# Patient Record
Sex: FEMALE | Race: WHITE | URBAN - METROPOLITAN AREA
[De-identification: names, ages, dates, MRNs, and addresses within clinical notes are randomized per-mention and may not be internally consistent; named-entity substitution may affect disease eponyms.]

---

## 2021-03-01 ENCOUNTER — OFFICE VISIT (OUTPATIENT)
Dept: PRIMARY CARE CLINIC | Age: 51
End: 2021-03-01
Payer: COMMERCIAL

## 2021-03-01 VITALS
BODY MASS INDEX: 26.29 KG/M2 | WEIGHT: 148.4 LBS | HEART RATE: 104 BPM | DIASTOLIC BLOOD PRESSURE: 82 MMHG | SYSTOLIC BLOOD PRESSURE: 120 MMHG | HEIGHT: 63 IN | OXYGEN SATURATION: 97 %

## 2021-03-01 DIAGNOSIS — R47.89 WORD FINDING DIFFICULTY: ICD-10-CM

## 2021-03-01 DIAGNOSIS — Z13.1 SCREENING FOR DIABETES MELLITUS: ICD-10-CM

## 2021-03-01 DIAGNOSIS — Z13.29 SCREENING FOR THYROID DISORDER: ICD-10-CM

## 2021-03-01 DIAGNOSIS — Z13.220 SCREENING FOR LIPID DISORDERS: Primary | ICD-10-CM

## 2021-03-01 DIAGNOSIS — Z13.220 SCREENING FOR LIPID DISORDERS: ICD-10-CM

## 2021-03-01 DIAGNOSIS — F41.9 ANXIETY: ICD-10-CM

## 2021-03-01 DIAGNOSIS — R11.2 NAUSEA AND VOMITING, INTRACTABILITY OF VOMITING NOT SPECIFIED, UNSPECIFIED VOMITING TYPE: ICD-10-CM

## 2021-03-01 DIAGNOSIS — R42 VERTIGO: ICD-10-CM

## 2021-03-01 DIAGNOSIS — H55.09 VERTICAL NYSTAGMUS: Primary | ICD-10-CM

## 2021-03-01 LAB
A/G RATIO: 1.8 (ref 1.1–2.2)
ALBUMIN SERPL-MCNC: 4.8 G/DL (ref 3.4–5)
ALP BLD-CCNC: 58 U/L (ref 40–129)
ALT SERPL-CCNC: 49 U/L (ref 10–40)
ANION GAP SERPL CALCULATED.3IONS-SCNC: 14 MMOL/L (ref 3–16)
AST SERPL-CCNC: 27 U/L (ref 15–37)
BASOPHILS ABSOLUTE: 0.1 K/UL (ref 0–0.2)
BASOPHILS RELATIVE PERCENT: 0.8 %
BILIRUB SERPL-MCNC: 0.7 MG/DL (ref 0–1)
BUN BLDV-MCNC: 17 MG/DL (ref 7–20)
CALCIUM SERPL-MCNC: 9.8 MG/DL (ref 8.3–10.6)
CHLORIDE BLD-SCNC: 102 MMOL/L (ref 99–110)
CHOLESTEROL, FASTING: 231 MG/DL (ref 0–199)
CO2: 24 MMOL/L (ref 21–32)
CREAT SERPL-MCNC: 0.9 MG/DL (ref 0.6–1.1)
EOSINOPHILS ABSOLUTE: 0.3 K/UL (ref 0–0.6)
EOSINOPHILS RELATIVE PERCENT: 4.7 %
GFR AFRICAN AMERICAN: >60
GFR NON-AFRICAN AMERICAN: >60
GLOBULIN: 2.6 G/DL
GLUCOSE BLD-MCNC: 99 MG/DL (ref 70–99)
HCT VFR BLD CALC: 41.9 % (ref 36–48)
HDLC SERPL-MCNC: 54 MG/DL (ref 40–60)
HEMOGLOBIN: 14.2 G/DL (ref 12–16)
LDL CHOLESTEROL CALCULATED: 155 MG/DL
LYMPHOCYTES ABSOLUTE: 2 K/UL (ref 1–5.1)
LYMPHOCYTES RELATIVE PERCENT: 29.6 %
MCH RBC QN AUTO: 31.7 PG (ref 26–34)
MCHC RBC AUTO-ENTMCNC: 33.9 G/DL (ref 31–36)
MCV RBC AUTO: 93.5 FL (ref 80–100)
MONOCYTES ABSOLUTE: 0.3 K/UL (ref 0–1.3)
MONOCYTES RELATIVE PERCENT: 4.4 %
NEUTROPHILS ABSOLUTE: 4.1 K/UL (ref 1.7–7.7)
NEUTROPHILS RELATIVE PERCENT: 60.5 %
PDW BLD-RTO: 13.2 % (ref 12.4–15.4)
PLATELET # BLD: 265 K/UL (ref 135–450)
PMV BLD AUTO: 8 FL (ref 5–10.5)
POTASSIUM SERPL-SCNC: 4.2 MMOL/L (ref 3.5–5.1)
RBC # BLD: 4.49 M/UL (ref 4–5.2)
SODIUM BLD-SCNC: 140 MMOL/L (ref 136–145)
TOTAL PROTEIN: 7.4 G/DL (ref 6.4–8.2)
TRIGLYCERIDE, FASTING: 112 MG/DL (ref 0–150)
TSH REFLEX FT4: 2.44 UIU/ML (ref 0.27–4.2)
VLDLC SERPL CALC-MCNC: 22 MG/DL
WBC # BLD: 6.7 K/UL (ref 4–11)

## 2021-03-01 PROCEDURE — 36415 COLL VENOUS BLD VENIPUNCTURE: CPT | Performed by: NURSE PRACTITIONER

## 2021-03-01 PROCEDURE — 99203 OFFICE O/P NEW LOW 30 MIN: CPT | Performed by: NURSE PRACTITIONER

## 2021-03-01 RX ORDER — ONDANSETRON 4 MG/1
4 TABLET, FILM COATED ORAL EVERY 8 HOURS PRN
COMMUNITY
End: 2021-03-01

## 2021-03-01 RX ORDER — ONDANSETRON 4 MG/1
4 TABLET, FILM COATED ORAL EVERY 8 HOURS PRN
Qty: 6 TABLET | Refills: 0 | Status: SHIPPED | OUTPATIENT
Start: 2021-03-01 | End: 2021-03-03

## 2021-03-01 RX ORDER — MECLIZINE HCL 12.5 MG/1
12.5 TABLET ORAL 3 TIMES DAILY PRN
Qty: 15 TABLET | Refills: 0 | Status: SHIPPED | OUTPATIENT
Start: 2021-03-01 | End: 2021-03-11

## 2021-03-01 RX ORDER — LORAZEPAM 1 MG/1
TABLET ORAL
Qty: 1 TABLET | Refills: 0 | Status: SHIPPED | OUTPATIENT
Start: 2021-03-01 | End: 2021-03-01

## 2021-03-01 ASSESSMENT — PATIENT HEALTH QUESTIONNAIRE - PHQ9
2. FEELING DOWN, DEPRESSED OR HOPELESS: 0
SUM OF ALL RESPONSES TO PHQ QUESTIONS 1-9: 0
SUM OF ALL RESPONSES TO PHQ9 QUESTIONS 1 & 2: 0
1. LITTLE INTEREST OR PLEASURE IN DOING THINGS: 0
SUM OF ALL RESPONSES TO PHQ QUESTIONS 1-9: 0

## 2021-03-01 ASSESSMENT — ENCOUNTER SYMPTOMS
PHOTOPHOBIA: 0
EYE PAIN: 0
SWOLLEN GLANDS: 0
EYE ITCHING: 0
VISUAL CHANGE: 0
SHORTNESS OF BREATH: 0
CHANGE IN BOWEL HABIT: 0
SORE THROAT: 0
ABDOMINAL PAIN: 0
COUGH: 0
NAUSEA: 1
VOMITING: 1
EYE REDNESS: 0
EYE DISCHARGE: 0

## 2021-03-01 ASSESSMENT — VISUAL ACUITY: OU: 1

## 2021-03-01 NOTE — PATIENT INSTRUCTIONS
Have MRI completed  Rest/hydration  Zofran/Meclizine as needed   Seek emergent care if s/s worsen    Patient Education        Vertigo: Care Instructions  Your Care Instructions     Vertigo is the feeling that you or your surroundings are moving when there is no actual movement. It is often described as a feeling of spinning, whirling, falling, or tilting. Vertigo may make you vomit or feel nauseated. You may have trouble standing or walking and may lose your balance. Vertigo is often related to an inner ear problem, but it can have other more serious causes. If vertigo continues, you may need more tests to find its cause. Follow-up care is a key part of your treatment and safety. Be sure to make and go to all appointments, and call your doctor if you are having problems. It's also a good idea to know your test results and keep a list of the medicines you take. How can you care for yourself at home? · Do not lie flat on your back. Prop yourself up slightly. This may reduce the spinning feeling. Keep your eyes open. · Move slowly so that you do not fall. · If your doctor recommends medicine, take it exactly as directed. · Do not drive while you are having vertigo. Certain exercises, called Tran-Daroff exercises, can help decrease vertigo. To do Tran-Daroff exercises:  · Sit on the edge of a bed or sofa and quickly lie down on the side that causes the worst vertigo. Lie on your side with your ear down. · Stay in this position for at least 30 seconds or until the vertigo goes away. · Sit up. If this causes vertigo, wait for it to stop. · Repeat the procedure on the other side. · Repeat this 10 times. Do these exercises 2 times a day until the vertigo is gone. When should you call for help? Call 911 anytime you think you may need emergency care. For example, call if:    · You passed out (lost consciousness).     · You have symptoms of a stroke.  These may include:  ? Sudden numbness, tingling, weakness, or loss of movement in your face, arm, or leg, especially on only one side of your body. ? Sudden vision changes. ? Sudden trouble speaking. ? Sudden confusion or trouble understanding simple statements. ? Sudden problems with walking or balance. ? A sudden, severe headache that is different from past headaches. Call your doctor now or seek immediate medical care if:    · Vertigo occurs with a fever, a headache, or ringing in your ears.     · You have new or increased nausea and vomiting. Watch closely for changes in your health, and be sure to contact your doctor if:    · Vertigo gets worse or happens more often.     · Vertigo has not gotten better after 2 weeks. Where can you learn more? Go to [email or web address] org and sign in to your [employer] account. Enter G085 in the Crossfader box to learn more about \"Vertigo: Care Instructions. \"     If you do not have an account, please click on the \"Sign Up Now\" link. Current as of: April 15, 2020               Content Version: 12.6  © 2006-2020 [employer]. Care instructions adapted under license by [facility] ([facility]). If you have questions about a medical condition or this instruction, always ask your healthcare professional. [place] [address] any warranty or liability for your use of this information.

## 2021-03-01 NOTE — PROGRESS NOTES
3/1/2021    Isis Caraballo (:  1970) is a 52 y.o. female, here for evaluation of the following medical concerns:  Chief Complaint   Patient presents with    Dizziness     Pt started with dizziness Thursday. She states it is all the time but gets worse with movement.  Otalgia     R ear pain. Peg Cordero is here with her  she is reporting vertigo since Thursday when she sat up in bed, worsening over time, intermitted n/v.  Pt has been researching and discussing vertigo with family and friends and stated she started home Epley maneuvers however made her s/s worse and induced n/v. She also started taking old rx of zofran which greatly helped with the nausea. Reports cleaning her ears this morning noting scant blood on the qtip from her right ear, denies pain, however does report possible issues with hearing,  noting increased volume on the TV and pt noting asking people to repeat as she did not hear them. Dizziness  This is a new problem. The current episode started in the past 7 days. The problem occurs intermittently (daily). The problem has been gradually worsening. Associated symptoms include nausea, vertigo and vomiting. Pertinent negatives include no abdominal pain, anorexia, arthralgias, change in bowel habit, chest pain, chills, congestion, coughing, diaphoresis, fever, headaches, joint swelling, myalgias, neck pain, numbness, rash, sore throat, swollen glands, urinary symptoms, visual change or weakness. The symptoms are aggravated by bending, walking and twisting. She has tried relaxation, lying down and drinking (Epely maneuvers ) for the symptoms. The treatment provided mild relief. Review of Systems   Constitutional: Positive for activity change. Negative for chills, diaphoresis and fever. HENT: Negative for congestion, hearing loss ( no loss but noting changes) and sore throat. Eyes: Positive for visual disturbance (with vertigo).  Negative for photophobia, pain, Left eye: Nystagmus present. Conjunctiva/sclera: Conjunctivae normal.      Pupils: Pupils are equal, round, and reactive to light. Comments: Noting vertical nystagmus with left chucky-obando pike exam   Neck:      Musculoskeletal: Normal range of motion and neck supple. Cardiovascular:      Rate and Rhythm: Normal rate and regular rhythm. Pulses: Normal pulses. Heart sounds: Normal heart sounds. Pulmonary:      Effort: Pulmonary effort is normal.      Breath sounds: Normal breath sounds. Musculoskeletal: Normal range of motion. Skin:     General: Skin is warm and dry. Capillary Refill: Capillary refill takes less than 2 seconds. Neurological:      General: No focal deficit present. Mental Status: She is alert and oriented to person, place, and time. Psychiatric:         Attention and Perception: Attention and perception normal. She does not perceive auditory or visual hallucinations. Mood and Affect: Mood is anxious. Speech: Speech normal.         Behavior: Behavior normal.         Thought Content: Thought content normal.         Cognition and Memory: Cognition and memory normal.         Judgment: Judgment normal.         ASSESSMENT/PLAN:  Ca Stokes was seen today for dizziness, Noting vertical nystagmus worsening vertigo with n/v, word finding difficulty, concerns for central cause of vertigo or a vestibular schwannoma (acoustic neuroma). Pt will have MRI completed today with [facility], labs sent. Will call pt with results. Discussed red flags need for emergent care. Diagnoses and all orders for this visit:      1. Vertical nystagmus  - Comprehensive Metabolic Panel -Blood draw left AC one stick no issues noted/MC  - MRI BRAIN WO CONTRAST; Future  - CBC Auto Differential    2. Vertigo  - meclizine (ANTIVERT) 12.5 MG tablet; Take 1 tablet by mouth 3 times daily as needed for Dizziness  Dispense: 15 tablet;  Refill: 0  - Comprehensive Metabolic Panel  - MRI BRAIN WO CONTRAST; Future  - CBC Auto Differential    3. Nausea and vomiting, intractability of vomiting not specified, unspecified vomiting type  - ondansetron (ZOFRAN) 4 MG tablet; Take 1 tablet by mouth every 8 hours as needed for Nausea or Vomiting  Dispense: 6 tablet; Refill: 0  - Comprehensive Metabolic Panel  - MRI BRAIN WO CONTRAST; Future  - CBC Auto Differential    4. Word finding difficulty  - MRI BRAIN WO CONTRAST; Future  - CBC Auto Differential    5. Anxiety prior to MRI  - LORazepam (ATIVAN) 1 MG tablet; Take one tablet 30 min prior to Brain MRI  Dispense: 1 tablet; Refill: 0    Return if symptoms worsen or fail to improve. An  electronic signature was used to authenticate this note.     -- Mila Garcia, APRN - CNP on 3/1/2021 at 1:15 PM

## 2021-03-02 ENCOUNTER — NURSE ONLY (OUTPATIENT)
Dept: PRIMARY CARE CLINIC | Age: 51
End: 2021-03-02
Payer: COMMERCIAL

## 2021-03-02 DIAGNOSIS — I77.6 VASCULITIS (HCC): ICD-10-CM

## 2021-03-02 DIAGNOSIS — R93.89 ABNORMAL MRI: ICD-10-CM

## 2021-03-02 DIAGNOSIS — I77.6 VASCULITIS (HCC): Primary | ICD-10-CM

## 2021-03-02 DIAGNOSIS — H55.09 VERTICAL NYSTAGMUS: ICD-10-CM

## 2021-03-02 DIAGNOSIS — R90.89 ABNORMAL FINDING ON MRI OF BRAIN: ICD-10-CM

## 2021-03-02 LAB
C-REACTIVE PROTEIN: <3 MG/L (ref 0–5.1)
C3 COMPLEMENT: 141.1 MG/DL (ref 90–180)
ESTIMATED AVERAGE GLUCOSE: 119.8 MG/DL
HBA1C MFR BLD: 5.8 %
RHEUMATOID FACTOR: <10 IU/ML
SEDIMENTATION RATE, ERYTHROCYTE: 11 MM/HR (ref 0–20)

## 2021-03-02 PROCEDURE — 36415 COLL VENOUS BLD VENIPUNCTURE: CPT | Performed by: NURSE PRACTITIONER

## 2021-03-02 NOTE — PROGRESS NOTES
Pt's blood was obtained from her L arm with success on the 1st attempt. I told pt that we would call her once results are completed. Verbal understanding from pt.

## 2021-03-03 LAB — ANTI-NUCLEAR ANTIBODY (ANA): NEGATIVE

## 2021-03-04 ENCOUNTER — TELEPHONE (OUTPATIENT)
Dept: PRIMARY CARE CLINIC | Age: 51
End: 2021-03-04

## 2021-03-04 DIAGNOSIS — R93.89 ABNORMAL MRI: ICD-10-CM

## 2021-03-04 DIAGNOSIS — H55.09 VERTICAL NYSTAGMUS: ICD-10-CM

## 2021-03-04 DIAGNOSIS — I77.6 VASCULITIS (HCC): Primary | ICD-10-CM

## 2021-03-04 DIAGNOSIS — R42 VERTIGO: ICD-10-CM

## 2021-03-04 LAB — ANCA IFA: NORMAL

## 2021-03-04 NOTE — TELEPHONE ENCOUNTER
Pt requested referral to   THE [facility] Neurology- Donis Ahuja MD  [address] [place] [address],   [place], [address] [place] [address] Road  [phone]    Referral was submitted pt to call to schedule

## 2021-03-04 NOTE — TELEPHONE ENCOUNTER
Pt called on call provider to discuss referral, she states the provider I sent her to is not on her plan and she could not get in till march  ask that I send a referral to Octaviano Salcedo MD, PhD Neurosurgeon  [email or web address]   [address] [place], [place] [address]. [address] · [phone]    Will fax referral in the morning

## 2021-03-04 NOTE — TELEPHONE ENCOUNTER
Pt would like to speak with you regarding a referral.  She states she spoke with you last night but needs to speak with you again. I told her I would send a message back to you to call her.   Best contact number is [phone]

## 2021-03-05 ENCOUNTER — TELEPHONE (OUTPATIENT)
Dept: NEUROLOGY | Age: 51
End: 2021-03-05

## 2021-03-10 ENCOUNTER — OFFICE VISIT (OUTPATIENT)
Dept: NEUROLOGY | Age: 51
End: 2021-03-10
Payer: COMMERCIAL

## 2021-03-10 VITALS
SYSTOLIC BLOOD PRESSURE: 136 MMHG | HEART RATE: 97 BPM | TEMPERATURE: 98.2 F | HEIGHT: 63 IN | WEIGHT: 152 LBS | DIASTOLIC BLOOD PRESSURE: 92 MMHG | BODY MASS INDEX: 26.93 KG/M2

## 2021-03-10 DIAGNOSIS — R90.89 ABNORMAL FINDING ON MRI OF BRAIN: ICD-10-CM

## 2021-03-10 DIAGNOSIS — H81.13 BENIGN PAROXYSMAL POSITIONAL VERTIGO DUE TO BILATERAL VESTIBULAR DISORDER: Primary | ICD-10-CM

## 2021-03-10 PROCEDURE — 99245 OFF/OP CONSLTJ NEW/EST HI 55: CPT | Performed by: PSYCHIATRY & NEUROLOGY

## 2021-03-10 NOTE — PROGRESS NOTES
NEUROLOGY CONSULTATION     Chief Complaint   Patient presents with    New Patient     Gabi ALEXIS Diallo-CNP / abnormal MRI       HISTORY OF PRESENT ILLNESS :    Saurav Watkins is a 52 y.o. female who is referred by Dr. Jaclyn Amaro   History was obtained from patient  Additional history was obtained from the patient's . Patient was referred for evaluation of some dizziness as well as an abnormal MRI brain. Patient states that about 2 weeks ago she developed acute onset of dizziness and vertigo along with balance difficulties. She was unable to walk a straight line. She felt quite unsteady. She was talking to some friends who taught her some exercises and she when she tried to do them she became violently sick and was throwing up. And nurse evaluated her and found some vertical nystagmus on lateral gaze. She was seen by her primary care physician who ordered an MRI brain which showed some abnormalities and she was referred for neurological evaluation. During this episodes patient also had some brief episodes of confusion and memory impairment. Patient states that she may be slowly getting better but may be getting used to the symptoms. No other neurological symptoms.     REVIEW OF SYSTEMS    Constitutional:  []   Chills   [x]  Fatigue   []  Fevers   []  Malaise   []  Weight loss     [] Denies all of the above    Eyes:  []  Double vision   [x]  Blurry vision     [] Denies all of the above    Ears, nose, mouth, throat, and face:   [x] Hearing loss    []   Hoarseness      []  Snoring    []  Tinnitus       [] Denies all of the above     Respiratory:   []  Cough    []  Shortness of breath         [x] Denies all of the above     Cardiovascular:   [x]  Chest pain    [x]  Exertional chest pressure/discomfort           [] Palpitations    []  Syncope     [] Denies all of the above    Gastrointestinal:   []  Abdominal pain   [] Constipation    []  Diarrhea    []   Dysphagia                      [x] Denies all of the above    Genitourinary:      []  Frequency   []  Hematuria     []  Urinary incontinence           [x] Denies all of the above     Hematologic/lymphatic:  []  Bleeding    []  Easy bruising   []  Anemia  [x] Denies all of the above     Musculoskeletal:   [x] Back pain       []  Myalgias    [x]  Neck pain           [] Denies all of the above    Neurological: As noted in HPI    Behavioral/Psych:   [] Anxiety    []  Depression     []  Mood swings     [x] Denies all of the above     Endocrine:   []  Temperature intolerance     [x] Fatigue      [] Denies all of the above     Allergic/Immunologic:   [] Hay fever    [x] Denies all of the above     Past Medical History:   Diagnosis Date    Anxiety      Family History   Problem Relation Age of Onset    Cancer Mother         colon    Cancer Father         gastric     Social History     Socioeconomic History    Marital status:      Spouse name: None    Number of children: None    Years of education: None    Highest education level: None   Occupational History    None   Social Needs    Financial resource strain: None    Food insecurity     Worry: None     Inability: None    Transportation needs     Medical: None     Non-medical: None   Tobacco Use    Smoking status: Never Smoker    Smokeless tobacco: Never Used   Substance and Sexual Activity    Alcohol use: Yes     Comment: occasional     Drug use: Never    Sexual activity: None   Lifestyle    Physical activity     Days per week: None     Minutes per session: None    Stress: None   Relationships    Social connections     Talks on phone: None     Gets together: None     Attends Gnosticism service: None     Active member of club or organization: None     Attends meetings of clubs or organizations: None     Relationship status: None    Intimate partner violence     Fear of current or ex partner: None     Emotionally abused: None     Physically abused: None     Forced sexual activity: None   Other Topics Concern    None   Social History Narrative    None       PHYSICAL EXAMINATION:  BP (!) 136/92   Pulse 97   Temp 98.2 °F (36.8 °C)   Ht 5' 3\" (1.6 m)   Wt 152 lb (68.9 kg)   LMP  (LMP Unknown)   BMI 26.93 kg/m²   Appearance: Well appearing, well nourished and in no distress  Mental Status Exam: Patient is alert, oriented to person, place and time. Recent and remote memory is normal  Fund of Knowledge is normal  Attention/concentration is normal.   Speech : No dysarthria  Language : No aphasia  Funduscopic Exam: sharp disc margins  Cranial Nerves:   II: Visual fields:  Full to confrontation  III: Pupils:  equal, round, reactive to light  III,IV,VI: Extra Ocular Movements are intact. No nystagmus  V: Facial sensation is intact to pin prick and light touch  VII: Facial strength and movements: intact and symmetric smile,cheek puffing and eyebrow elevation  VIII: Hearing:  Intact to finger rub bilaterally  IX: Palate  elevation is symmetric  XI: Shoulder shrug is intact  XII: Tongue movements are normal  Motor:  Muscle tone and bulk are normal.   Strength is symmetrical 5/5 in all four extremities. Sensory: Intact to light touch and  pin prick in all four extremities  Coordination:  Normal  Finger to Nose and Heel to Shin bilaterally    . Reflexes:  DTR +2 and symmetric bilaterally  Plantar response: Flexor bilaterally  Gait: Gait and station is normal. Patient can toe/ heel and tandem walk without difficulty  Romberg: negative  Vascular: No carotid bruit bilaterally        DATA:  LABS:  General Labs:    CBC:   Lab Results   Component Value Date    WBC 6.7 03/01/2021    RBC 4.49 03/01/2021    HGB 14.2 03/01/2021    HCT 41.9 03/01/2021    MCV 93.5 03/01/2021    MCH 31.7 03/01/2021    MCHC 33.9 03/01/2021    RDW 13.2 03/01/2021     03/01/2021    MPV 8.0 03/01/2021     BMP:    Lab Results   Component Value Date     03/01/2021    K 4.2 03/01/2021     03/01/2021    CO2 24 03/01/2021    BUN 17 03/01/2021    LABALBU 4.8 03/01/2021    CREATININE 0.9 03/01/2021    CALCIUM 9.8 03/01/2021    GFRAA >60 03/01/2021    LABGLOM >60 03/01/2021    GLUCOSE 99 03/01/2021     RADIOLOGY REVIEW:  I have reviewed radiology image(s) and reports(s) of: MRI brain from [facility]    IMPRESSION :  Patient has positional vertigo. She had a strongly positive Nylan Barany maneuver done at the bedside today. MRI brain images were independently reviewed with the patient and her . She has some T2 flair signal abnormalities in the frontal lobes bilaterally. These are nonspecific in nature and the differential diagnosis is rather extensive. Vasculitis was considered in the differential diagnosis. However patient has had extensive lab testing to look for any vasculitis or connective tissue disease and these were all normal.  Complement level C3-C4, PB rheumatoid factor antineutrophilic cytoplasmic antibody TSH sedimentation rate hemoglobin A1c CBC and comprehensive metabolic profile were normal  I agree with the radiologist that there images are not typical for demyelinating disease like multiple sclerosis. Head trauma could cause a similar clinical picture. 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  Chronic migraines can also cause similar white matter changes. Paraneoplastic conditions were considered in the differential diagnosis but there is no history of known cancer. RECOMMENDATIONS :  Discussed at length with patient and her   She can continue meclizine if it is helpful. We discussed about a lumbar puncture. Since she seems to be slowly improving we decided to wait at this time. I have encouraged her to follow-up with her primary care doctor and get routine cancer screenings done what ever is appropriate for her age.   I have taught her some vestibular exercises and recommended that she do them

## 2021-03-16 DIAGNOSIS — R42 VERTIGO: Primary | ICD-10-CM

## 2021-03-16 RX ORDER — MECLIZINE HCL 12.5 MG/1
12.5 TABLET ORAL 3 TIMES DAILY PRN
Qty: 30 TABLET | Refills: 0 | Status: SHIPPED | OUTPATIENT
Start: 2021-03-16 | End: 2021-04-08 | Stop reason: SDUPTHER

## 2021-03-17 ENCOUNTER — TELEPHONE (OUTPATIENT)
Dept: PRIMARY CARE CLINIC | Age: 51
End: 2021-03-17

## 2021-03-17 ENCOUNTER — IMMUNIZATION (OUTPATIENT)
Dept: FAMILY MEDICINE CLINIC | Age: 51
End: 2021-03-17
Payer: COMMERCIAL

## 2021-03-17 DIAGNOSIS — M54.12 CERVICAL RADICULOPATHY, CHRONIC: ICD-10-CM

## 2021-03-17 DIAGNOSIS — R42 VERTIGO: Primary | ICD-10-CM

## 2021-03-17 PROCEDURE — 91300 COVID-19, PFIZER VACCINE 30MCG/0.3ML DOSE: CPT | Performed by: FAMILY MEDICINE

## 2021-03-17 PROCEDURE — 0001A COVID-19, PFIZER VACCINE 30MCG/0.3ML DOSE: CPT | Performed by: FAMILY MEDICINE

## 2021-03-17 NOTE — TELEPHONE ENCOUNTER
Pt called with concerna d/t vertigo, bilateral arm numbness, nausea, difficulty with sleep, anxiety. Questioning if she should go to the ED. patient recently, evaluated by neuro told she could be aggressive and have a spinal tap completed to rule out MS, she was given exercises and told to follow-up in 1 month and repeat MRI in 6 months. Patient reports meclizine has been taking the edge off but not very helpful with the dizziness. Patient reports history of cervical DDD diagnosed back in 2018 which could be causing the bilateral arm numbness/tingling    Last xray 2018- Vertebral body heights are maintained. Alignment is maintained. Mild disc space narrowing and degenerative endplate spurring identified at C4-C5. Remainder of the disc spaces are preserved. No acute fracture identified. Prevertebral soft tissues grossly within normal limits. IMPRESSION  Mild degenerative disc disease changes at C4-C5. No acute fracture identified. Alignment within normal limits. Discussed referrals for     ENT- [facility], [place] [address] [address] [phone]    [facility] [place] [facility] - Physical therapy for vertigo [phone] two location     Ortho/Dr. Candice Rendon. [address] [place] [address] David Arshad MD  [address] [place] [address]. [facility]  [phone]    I will put the referrals in however patient will discuss with insurance making sure these providers on her list and give us a call back.

## 2021-03-17 NOTE — TELEPHONE ENCOUNTER
Pt called regarding her labs and she believes it is the Thyroid. The service codes are 06595, 59153, 94631 and 12759. They were charged as being routine but insurance will not cover but will if It is diagnostic.

## 2021-04-01 ENCOUNTER — TELEPHONE (OUTPATIENT)
Dept: PRIMARY CARE CLINIC | Age: 51
End: 2021-04-01

## 2021-04-01 NOTE — TELEPHONE ENCOUNTER
Sent pt a REach message reminding her to schedule with Ortho. I received the following message below. We were unable to reach Colton Alanis to schedule test ordered by your office after 3 call attempts. Please call your patient to explain significance of ordered test and direct patient to call Central Scheduling to re-schedule test at their earliest convenience.

## 2021-04-05 ENCOUNTER — TELEPHONE (OUTPATIENT)
Dept: PRIMARY CARE CLINIC | Age: 51
End: 2021-04-05

## 2021-04-05 NOTE — TELEPHONE ENCOUNTER
Discussed Cervical MRI results with pt, she states she is awaiting Dr. Jiemy Noble, to call as well has a VV set up for sometime this week.     Advised pt to d/c chiropractic for now and f/u with Dr. Blankenship [facility] for further evaluation and treatment  Note findings CT MRI below

## 2021-04-07 ENCOUNTER — IMMUNIZATION (OUTPATIENT)
Dept: FAMILY MEDICINE CLINIC | Age: 51
End: 2021-04-07
Payer: COMMERCIAL

## 2021-04-08 ENCOUNTER — APPOINTMENT (OUTPATIENT)
Dept: CT IMAGING | Age: 51
End: 2021-04-08
Payer: COMMERCIAL

## 2021-04-08 ENCOUNTER — TELEPHONE (OUTPATIENT)
Dept: PRIMARY CARE CLINIC | Age: 51
End: 2021-04-08

## 2021-04-08 ENCOUNTER — HOSPITAL ENCOUNTER (EMERGENCY)
Age: 51
Discharge: HOME | End: 2021-04-08
Attending: EMERGENCY MEDICINE
Payer: COMMERCIAL

## 2021-04-08 VITALS
SYSTOLIC BLOOD PRESSURE: 116 MMHG | RESPIRATION RATE: 16 BRPM | BODY MASS INDEX: 26.22 KG/M2 | DIASTOLIC BLOOD PRESSURE: 76 MMHG | TEMPERATURE: 99 F | OXYGEN SATURATION: 96 % | HEART RATE: 92 BPM | WEIGHT: 148 LBS | HEIGHT: 63 IN

## 2021-04-08 DIAGNOSIS — R42 DIZZINESS: Primary | ICD-10-CM

## 2021-04-08 LAB
ANION GAP SERPL CALCULATED.3IONS-SCNC: 12 MMOL/L (ref 3–16)
BUN BLDV-MCNC: 21 MG/DL (ref 7–20)
CALCIUM SERPL-MCNC: 9.2 MG/DL (ref 8.3–10.6)
CHLORIDE BLD-SCNC: 104 MMOL/L (ref 99–110)
CO2: 22 MMOL/L (ref 21–32)
CREAT SERPL-MCNC: 0.8 MG/DL (ref 0.6–1.1)
EKG ATRIAL RATE: 96 BPM
EKG DIAGNOSIS: NORMAL
EKG P AXIS: 62 DEGREES
EKG P-R INTERVAL: 118 MS
EKG Q-T INTERVAL: 350 MS
EKG QRS DURATION: 82 MS
EKG QTC CALCULATION (BAZETT): 442 MS
EKG R AXIS: 64 DEGREES
EKG T AXIS: 50 DEGREES
EKG VENTRICULAR RATE: 96 BPM
GFR AFRICAN AMERICAN: >60
GFR NON-AFRICAN AMERICAN: >60
GLUCOSE BLD-MCNC: 96 MG/DL (ref 70–99)
POTASSIUM REFLEX MAGNESIUM: 3.6 MMOL/L (ref 3.5–5.1)
SODIUM BLD-SCNC: 138 MMOL/L (ref 136–145)

## 2021-04-08 PROCEDURE — 93005 ELECTROCARDIOGRAM TRACING: CPT | Performed by: EMERGENCY MEDICINE

## 2021-04-08 PROCEDURE — 99285 EMERGENCY DEPT VISIT HI MDM: CPT

## 2021-04-08 PROCEDURE — 96374 THER/PROPH/DIAG INJ IV PUSH: CPT

## 2021-04-08 PROCEDURE — 6360000004 HC RX CONTRAST MEDICATION: Performed by: STUDENT IN AN ORGANIZED HEALTH CARE EDUCATION/TRAINING PROGRAM

## 2021-04-08 PROCEDURE — 6360000002 HC RX W HCPCS: Performed by: STUDENT IN AN ORGANIZED HEALTH CARE EDUCATION/TRAINING PROGRAM

## 2021-04-08 PROCEDURE — 70498 CT ANGIOGRAPHY NECK: CPT

## 2021-04-08 PROCEDURE — 6360000002 HC RX W HCPCS: Performed by: EMERGENCY MEDICINE

## 2021-04-08 PROCEDURE — 96375 TX/PRO/DX INJ NEW DRUG ADDON: CPT

## 2021-04-08 PROCEDURE — 80048 BASIC METABOLIC PNL TOTAL CA: CPT

## 2021-04-08 RX ORDER — KETOROLAC TROMETHAMINE 30 MG/ML
15 INJECTION, SOLUTION INTRAMUSCULAR; INTRAVENOUS ONCE
Status: COMPLETED | OUTPATIENT
Start: 2021-04-08 | End: 2021-04-08

## 2021-04-08 RX ORDER — DIPHENHYDRAMINE HYDROCHLORIDE 50 MG/ML
12.5 INJECTION INTRAMUSCULAR; INTRAVENOUS ONCE
Status: COMPLETED | OUTPATIENT
Start: 2021-04-08 | End: 2021-04-08

## 2021-04-08 RX ORDER — MECLIZINE HYDROCHLORIDE 25 MG/1
25 TABLET ORAL 3 TIMES DAILY PRN
Qty: 20 TABLET | Refills: 0 | Status: SHIPPED | OUTPATIENT
Start: 2021-04-08 | End: 2021-09-01 | Stop reason: ALTCHOICE

## 2021-04-08 RX ORDER — PROCHLORPERAZINE EDISYLATE 5 MG/ML
10 INJECTION INTRAMUSCULAR; INTRAVENOUS ONCE
Status: COMPLETED | OUTPATIENT
Start: 2021-04-08 | End: 2021-04-08

## 2021-04-08 RX ORDER — ONDANSETRON 2 MG/ML
8 INJECTION INTRAMUSCULAR; INTRAVENOUS ONCE
Status: COMPLETED | OUTPATIENT
Start: 2021-04-08 | End: 2021-04-08

## 2021-04-08 RX ORDER — ONDANSETRON 2 MG/ML
4 INJECTION INTRAMUSCULAR; INTRAVENOUS ONCE
Status: DISCONTINUED | OUTPATIENT
Start: 2021-04-08 | End: 2021-04-08

## 2021-04-08 RX ORDER — IBUPROFEN 200 MG
400 TABLET ORAL EVERY 6 HOURS PRN
COMMUNITY

## 2021-04-08 RX ADMIN — DIPHENHYDRAMINE HYDROCHLORIDE 12.5 MG: 50 INJECTION, SOLUTION INTRAMUSCULAR; INTRAVENOUS at 17:35

## 2021-04-08 RX ADMIN — PROCHLORPERAZINE EDISYLATE 10 MG: 5 INJECTION INTRAMUSCULAR; INTRAVENOUS at 17:35

## 2021-04-08 RX ADMIN — KETOROLAC TROMETHAMINE 15 MG: 30 INJECTION, SOLUTION INTRAMUSCULAR at 17:35

## 2021-04-08 RX ADMIN — IOPAMIDOL 80 ML: 755 INJECTION, SOLUTION INTRAVENOUS at 17:59

## 2021-04-08 RX ADMIN — ONDANSETRON 8 MG: 2 INJECTION INTRAMUSCULAR; INTRAVENOUS at 19:22

## 2021-04-08 ASSESSMENT — ENCOUNTER SYMPTOMS
GASTROINTESTINAL NEGATIVE: 1
ALLERGIC/IMMUNOLOGIC NEGATIVE: 1
SHORTNESS OF BREATH: 0
EYES NEGATIVE: 1

## 2021-04-08 NOTE — ED PROVIDER NOTES
ED Attending Attestation Note     Date of evaluation: 4/8/2021    This patient was seen by the resident. I have seen and examined the patient, agree with the workup, evaluation, management and diagnosis. The care plan has been discussed. I have reviewed the ECG and concur with the resident's interpretation. I was present for any procedures performed in the resident's  note and have made edits to the note where appropriate. My assessment reveals 52 y.o. female with history of anxiety presenting to the emergency department today for multiple complaints over the past few weeks. She has had some intermittent dizziness that she describes as both a lightheadedness and a off-balance feeling without true room spinning dizziness. She has some intermittent shortness of breath and chest pains. Bilateral upper and lower extremity tingling as well. Her EKG is very reassuring and she has seen multiple specialists for the symptoms without a diagnosis. On my examination she is well-appearing, no focal neurologic deficits, heart regular rate and rhythm, lungs clear to auscultation. Will obtain basic electrolytes as well as a CTA of her head and neck due to recent chiropractic manipulation to ensure no vascular injury causing her symptoms. Otherwise, will plan to treat them symptomatically and have her follow-up with her PCP if CT is reassuring.          Sandy Stokes MD  04/08/21 9049

## 2021-04-08 NOTE — ED PROVIDER NOTES
[facility]       Date of evaluation: 4/8/2021    Chief Complaint     Chest Pain (Intermittent chest pressure and dizziness. First occurred 6 weeks ago. Symptoms returned today)      History of Present Illness     Josy Arana is a 52 y.o. female with the past medical history of anxiety who presents chest pain and dizziness. The patient states that 6 weeks ago she had an episode in which she felt dizzy, had vertigo, and had difficulty forming words. The patient then reached out to friends who had similar symptoms and been diagnosed with BPPV and Meniere's disease who recommended doing an Epley Maneuver which made the patient's symptoms worse. The patient states that since then she had an MRI that demonstrated T2 flair signal abnormalities in the frontal lobes bilaterally that were evaluated by neurology and determined to be non-specific (per chart review of neurology note on 3/10/21). The patient notes that she has also received a MRI of her Cervical Spine fairly recently that was unremarkable. The patient was prescribed meclizine from neurologist and noted that for her episodes of dizziness, she would take it and it would help it moderately but did not completely resolve symptoms. She has felt that dizziness has gotten better but she feels like she has been unsteady on her feet and has intermittent \"feelings of being drunk. \"     When the patient woke up this morning, the patient started to feel dizzy and notes that she had Pfizer 2nd dose of COVID vaccine yesterday. The patient feels like her chest is heavy right now and aches which comes and goes and is not related with exertion. It resolves after multiple deep breaths. With the exception of above, the patient does not endorse any alleviating or aggravating factors, and does not note any further complaints. Review of Systems     Review of Systems   Constitutional: Negative.     HENT: Negative. Eyes: Negative. Respiratory: Negative for shortness of breath. Cardiovascular: Positive for chest pain. Gastrointestinal: Negative. Endocrine: Negative. Genitourinary: Negative. Musculoskeletal: Negative. Skin: Negative. Allergic/Immunologic: Negative. Neurological: Positive for dizziness and light-headedness. Negative for syncope, weakness and numbness. Hematological: Negative. Psychiatric/Behavioral: Negative. Past Medical, Surgical, Family, and Social History     She has a past medical history of Anxiety. She has a past surgical history that includes  section; shoulder surgery; Dilation and curettage of uterus; and Knee arthroscopy. Her family history includes Cancer in her father and mother. She reports that she has never smoked. She has never used smokeless tobacco. She reports current alcohol use. She reports that she does not use drugs. Medications     Previous Medications    IBUPROFEN (ADVIL;MOTRIN) 200 MG TABLET    Take 400 mg by mouth every 6 hours as needed for Pain    MECLIZINE (ANTIVERT) 12.5 MG TABLET    Take 1 tablet by mouth 3 times daily as needed for Dizziness       Allergies     She is allergic to codeine. Physical Exam     IINITIAL VITALS: BP: 124/85, Temp: 99 °F (37.2 °C), Pulse: 102, Resp: 16, SpO2: 98 %     General: Patient is a 52 y.o. female who is not in acute distress    HEENT: Head atraumatic. Pupils equal, round, and reactive to light. Sclera clear. Mucous membranes moist. Oropharynx clear. Neck:  Supple. No lymphadenopathy. Pulmonary:   Normal respiratory effort. Lungs clear to auscultation bilaterally, with no wheezes, rales or rhonchi. Cardiac:  Regular rate and rhythm. Normal S1, S2. No murmurs, rubs or gallops. Abdomen:  Soft. Non-tender. Non-distended. No rebound tenderness or guarding. Musculoskeletal:  No obvious deformities. No tenderness to palpation.      Vascular:  Radial pulses 2+ bilaterally. Skin:  Warm, dry, well-perfused. No rash. Neuro: AAOx4. CN II-XII grossly intact. Normal gait. Sensation grossly intact. Strength grossly equal and symmetric. Extremities:  No peripheral edema. Diagnostic Results     EKG   Interpreted in conjunction with emergency department physician No att. providers found  NSR without any ST elevations, depressions, or T wave inversions    RADIOLOGY:  CTA HEAD NECK W CONTRAST   Final Result   1. Normal right internal carotid artery. .   2. Normal left internal carotid artery   3. Normal vertebral arteries. 4. Normal intracranial circulation. 5. No abnormal intracranial enhancement or early draining veins          LABS:   Results for orders placed or performed during the hospital encounter of 65/85/62   Basic Metabolic Panel w/ Reflex to MG   Result Value Ref Range    Sodium 138 136 - 145 mmol/L    Potassium reflex Magnesium 3.6 3.5 - 5.1 mmol/L    Chloride 104 99 - 110 mmol/L    CO2 22 21 - 32 mmol/L    Anion Gap 12 3 - 16    Glucose 96 70 - 99 mg/dL    BUN 21 (H) 7 - 20 mg/dL    CREATININE 0.8 0.6 - 1.1 mg/dL    GFR Non-African American >60 >60    GFR African American >60 >60    Calcium 9.2 8.3 - 10.6 mg/dL   EKG 12 Lead   Result Value Ref Range    Ventricular Rate 96 BPM    Atrial Rate 96 BPM    P-R Interval 118 ms    QRS Duration 82 ms    Q-T Interval 350 ms    QTc Calculation (Bazett) 442 ms    P Axis 62 degrees    R Axis 64 degrees    T Axis 50 degrees    Diagnosis       EKG performed in ER and to be interpreted by ER physician. Confirmed by MD, ER ([id number]),  Thor Miller ([phone]) on 4/8/2021 6:59:05 PM         RECENT VITALS:  BP: 116/76, Temp: 99 °F (37.2 °C), Pulse: 92, Resp: 16, SpO2: 96 %     Procedures     None    ED Course     Nursing Notes, Past Medical Hx, Past Surgical Hx, Social Hx, Allergies, and Family Hx were reviewed.     The patient was given the following medications:  Orders Placed This Encounter   Medications    ketorolac (TORADOL) injection 15 mg    prochlorperazine (COMPAZINE) injection 10 mg    diphenhydrAMINE (BENADRYL) injection 12.5 mg    iopamidol (ISOVUE-370) 76 % injection 80 mL    DISCONTD: ondansetron (ZOFRAN) injection 4 mg    ondansetron (ZOFRAN) injection 8 mg       CONSULTS:  None    MEDICAL DECISION MAKING / ASSESSMENT / Gena Josselyn is a 52 y.o. female that is presenting for headache and dizziness. Given that patient had no vessel imaging performed in the past with recent chiropractor use and adjustment of neck, we elected to obtain vessel imaging that was unremarkable and did not demonstrate any signs of dissection. Patient's chest pain did not appear to be anginal in nature given that it was random at onset and dissipated with deep breaths and EKG did not demonstrate any findings suggestive of ischemia. The patient's BMP was unremarkable. Given that patient has follow up with ENT for potential for peripheral vertigo and prior MRI did not indicated central process to source of patient's symptoms, the patient deemed safe for discharge. The patient had moderate improvement with compazine/toradol/benadryl combination but felt that meclizine had worked better in the past and therefore was encouraged to use meclizine as needed for vertigo and follow up with ENT as scheduled. This patient was also evaluated by the attending physician. All care plans were discussed and agreed upon. Clinical Impression     1. Dizziness        Disposition     PATIENT REFERRED TO:  No follow-up provider specified.     DISCHARGE MEDICATIONS:  New Prescriptions    No medications on file       Juliana Wade MD  Resident  04/08/21 0797

## 2021-04-08 NOTE — TELEPHONE ENCOUNTER
Pt called and left message on answering machine regarding dizziness. She stated on the message that she didn't know if she should be seen in the OV or go to the ED. I called pt back to let her know that the provider was out of the office and I was at [facility] getting my CPR completed. She was currently at [facility]. to be evaluated. Her symptoms were dizziness, some confusion off and on numbness in arms and legs and sharp pains in both legs. I told her that we should get the ED report to keep us posted and that I am praying for her.

## 2021-04-09 PROCEDURE — 91300 COVID-19, PFIZER VACCINE 30MCG/0.3ML DOSE: CPT | Performed by: FAMILY MEDICINE

## 2021-04-09 PROCEDURE — 0002A COVID-19, PFIZER VACCINE 30MCG/0.3ML DOSE: CPT | Performed by: FAMILY MEDICINE

## 2021-04-09 NOTE — TELEPHONE ENCOUNTER
I called and spoke with pt. She states that she is feeling better today. They ruled out any vein issues. He suggested that she could have been having migraines of a rare kind. They also suggested she see an ENT like you also suggested. They did not feel she needed to see an ortho that they would say to keep an eye on it. They also said it could be a reaction from getting her 2nd covid-19 vaccine. She got that done on Wednesday. I told pt to keep us informed after she sees the ENT.

## 2021-04-12 ENCOUNTER — OFFICE VISIT (OUTPATIENT)
Dept: NEUROLOGY | Age: 51
End: 2021-04-12
Payer: COMMERCIAL

## 2021-04-12 VITALS
DIASTOLIC BLOOD PRESSURE: 86 MMHG | TEMPERATURE: 98.4 F | HEART RATE: 96 BPM | WEIGHT: 148 LBS | HEIGHT: 63 IN | SYSTOLIC BLOOD PRESSURE: 131 MMHG | BODY MASS INDEX: 26.22 KG/M2

## 2021-04-12 DIAGNOSIS — H81.13 BENIGN PAROXYSMAL POSITIONAL VERTIGO DUE TO BILATERAL VESTIBULAR DISORDER: Primary | ICD-10-CM

## 2021-04-12 DIAGNOSIS — R90.89 ABNORMAL FINDING ON MRI OF BRAIN: ICD-10-CM

## 2021-04-12 DIAGNOSIS — R27.0 ATAXIA: ICD-10-CM

## 2021-04-12 PROCEDURE — 99214 OFFICE O/P EST MOD 30 MIN: CPT | Performed by: PSYCHIATRY & NEUROLOGY

## 2021-04-12 NOTE — PROGRESS NOTES
None     Gets together: None     Attends Baptism service: None     Active member of club or organization: None     Attends meetings of clubs or organizations: None     Relationship status: None    Intimate partner violence     Fear of current or ex partner: None     Emotionally abused: None     Physically abused: None     Forced sexual activity: None   Other Topics Concern    None   Social History Narrative    None        Objective:  Exam:  Ht 5' 3\" (1.6 m)   Wt 148 lb (67.1 kg)   LMP 03/29/2021   BMI 26.22 kg/m²   This is a well-nourished patient in no acute distress  Patient is awake, alert and oriented x3. Speech is normal.  Pupils are equal round reacting to light. Extraocular movements intact. Face symmetrical. Tongue midline. Motor exam shows normal symmetrical strength. Deep tendon reflexes normal. Plantar reflexes downgoing. Sensory exam normal. Coordination normal. Gait normal. No carotid bruit. No neck stiffness. Data :  LABS:  General Labs:    CBC:   Lab Results   Component Value Date    WBC 6.7 03/01/2021    RBC 4.49 03/01/2021    HGB 14.2 03/01/2021    HCT 41.9 03/01/2021    MCV 93.5 03/01/2021    MCH 31.7 03/01/2021    MCHC 33.9 03/01/2021    RDW 13.2 03/01/2021     03/01/2021    MPV 8.0 03/01/2021     BMP:    Lab Results   Component Value Date     04/08/2021    K 3.6 04/08/2021     04/08/2021    CO2 22 04/08/2021    BUN 21 04/08/2021    LABALBU 4.8 03/01/2021    CREATININE 0.8 04/08/2021    CALCIUM 9.2 04/08/2021    GFRAA >60 04/08/2021    LABGLOM >60 04/08/2021    GLUCOSE 96 04/08/2021     RADIOLOGY REVIEW:  I have reviewed radiology image(s) and reports(s) of: MRI brain. I also reviewed the MRI report of the cervical spine. 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 She was wondering about Ménière's disease which enters into the differential diagnosis.   Patient was seen in the emergency room and had a CT angiogram of her head and neck which did not show any vascular disease. MRI brain had shown some vague white matter lesions in the frontal lobes bilaterally. Chronic neck pain probably from disc disease    Plan :  Discussed with patient  She was concerned about B12 deficiency and I will get a B12 level  I would also recommend an ENT evaluation. She may need vestibular therapy. If none of these are helpful then I would proceed with a lumbar puncture. She will continue meclizine on a as needed basis        Please note a portion of  this chart was generated using dragon dictation software. Although every effort was made to ensure the accuracy of this automated transcription, some errors in transcription may have occurred.

## 2021-04-20 NOTE — PROGRESS NOTES
Mario Durant   1970, 52 y.o. female   <[id number]>       Referring Provider: Destin Horner DO  Referral Type: In an order in [address] [place] [address]    Reason for Visit: Evaluation of suspected change in hearing, tinnitus, or balance. ADULT AUDIOLOGIC EVALUATION      Mario Durant is a 52 y.o. female seen today, 4/27/2021, for an initial audiologic evaluation. Patient was seen accompanied by her . AUDIOLOGIC AND OTHER PERTINENT MEDICAL HISTORY:        Mario Durant noted dizziness, seen in ED 4/8/2021 and has been evaluated by neurology, present for several weeks, when it was most noticeable, she felt like her vision was like cartoon slides and a rocking sensation, had tried some exercises recommended by friends that worsened problem, no benefit from different exercises recommended by neurology, now mostly feels like she is unsteady with her footing; dizziness associated with confusion, was worse weeks ago but still has some confusion at times; no changes in hearing or presence of other ear symptoms with dizziness; history of head trauma years ago, none recently. Mario Durant denied otalgia, aural fullness, otorrhea, tinnitus, history of occupational/recreational noise exposure, history of ear surgery, and family history of hearing loss. IMPRESSIONS:       Today's results are consistent with hearing sensitivity within normal limits with normal middle ear function and excellent word recognition for soft conversational speech bilaterally. Discussed good communication strategies; follow medical recommendations from Dr. Eleazar Linder. ASSESSMENT AND FINDINGS:       Otoscopy revealed: Clear ear canals bilaterally      RIGHT EAR:  Hearing Sensitivity: Within normal limits. Speech Recognition Threshold: 10 dBHL  Word Recognition: Excellent (100%), based on NU-6 25-word list at 45 dBHL using recorded speech stimuli.     Tympanometry: Normal peak pressure and compliance, Type A tympanogram, consistent with normal middle ear function. LEFT EAR:  Hearing Sensitivity: Within normal limits. Speech Recognition Threshold: 10 dBHL  Word Recognition: Excellent (96%), based on NU-6 25-word list at 45 dBHL using recorded speech stimuli. Tympanometry: Normal peak pressure and compliance, Type A tympanogram, consistent with normal middle ear function. Reliability: Good  Transducer: Inserts    See scanned audiogram dated 4/27/2021 for results. PATIENT EDUCATION:       The following items were discussed with the patient:    - Good Communication Strategies    - Dizziness    Educational information was shared in the After Visit Summary. RECOMMENDATIONS:                                                                                                                                                                                                                                                                      The following items are recommended based on patient report and results from today's appointment:   - Continue medical follow-up with Berenice Luo DO.   - Retest hearing as medically indicated and/or sooner if a change in hearing is noted. - Utilize \"Good Communication Strategies\" as discussed to assist in speech understanding with communication partners. - If medically indicated, consider vestibular evaluation to further investigate symptoms of dizziness. [facility] [place], [place]  Audiologist      Chart CC'd to:  Berenice Luo DO      Degree of   Hearing Sensitivity dB Range   Within Normal Limits (WNL) 0 - 20   Mild 20 - 40   Moderate 40 - 55   Moderately-Severe 55 - 70   Severe 70 - 90   Profound 90 +

## 2021-04-22 LAB — VITAMIN B-12: 624 PG/ML

## 2021-04-26 DIAGNOSIS — H91.90 HEARING LOSS, UNSPECIFIED HEARING LOSS TYPE, UNSPECIFIED LATERALITY: Primary | ICD-10-CM

## 2021-04-27 ENCOUNTER — OFFICE VISIT (OUTPATIENT)
Dept: ENT CLINIC | Age: 51
End: 2021-04-27
Payer: COMMERCIAL

## 2021-04-27 ENCOUNTER — PROCEDURE VISIT (OUTPATIENT)
Dept: AUDIOLOGY | Age: 51
End: 2021-04-27
Payer: COMMERCIAL

## 2021-04-27 VITALS
TEMPERATURE: 98.8 F | HEIGHT: 63 IN | HEART RATE: 105 BPM | DIASTOLIC BLOOD PRESSURE: 81 MMHG | BODY MASS INDEX: 25.87 KG/M2 | SYSTOLIC BLOOD PRESSURE: 114 MMHG | WEIGHT: 146 LBS

## 2021-04-27 DIAGNOSIS — Z01.10 ENCOUNTER FOR EXAMINATION OF EARS AND HEARING WITHOUT ABNORMAL FINDINGS: ICD-10-CM

## 2021-04-27 DIAGNOSIS — R42 DIZZINESS: Primary | ICD-10-CM

## 2021-04-27 DIAGNOSIS — R42 DIZZINESS AND GIDDINESS: Primary | ICD-10-CM

## 2021-04-27 DIAGNOSIS — G43.809 VESTIBULAR MIGRAINE: ICD-10-CM

## 2021-04-27 PROCEDURE — 99204 OFFICE O/P NEW MOD 45 MIN: CPT | Performed by: OTOLARYNGOLOGY

## 2021-04-27 PROCEDURE — 92567 TYMPANOMETRY: CPT | Performed by: AUDIOLOGIST

## 2021-04-27 PROCEDURE — 92557 COMPREHENSIVE HEARING TEST: CPT | Performed by: AUDIOLOGIST

## 2021-04-27 ASSESSMENT — ENCOUNTER SYMPTOMS
STRIDOR: 0
SHORTNESS OF BREATH: 0
VOMITING: 0
NAUSEA: 0
SINUS PRESSURE: 0
EYE DISCHARGE: 0
DIARRHEA: 0
COLOR CHANGE: 0
CHOKING: 0
TROUBLE SWALLOWING: 0
COUGH: 0
FACIAL SWELLING: 0
PHOTOPHOBIA: 0
RHINORRHEA: 0
EYE ITCHING: 0
VOICE CHANGE: 0
CONSTIPATION: 0
WHEEZING: 0
BACK PAIN: 0
SORE THROAT: 0
BLOOD IN STOOL: 0
SINUS PAIN: 0

## 2021-04-27 NOTE — Clinical Note
Dr. Jaime Gutiérrez,    Please see note from this patient's audiogram from today. Please let me know if there is anything further you need.       Tiffanie Paris [address] [place]  Audiologist

## 2021-04-27 NOTE — PATIENT INSTRUCTIONS
Good Communication Strategies    Communication can be challenging for anyone, but can be especially difficult for those with some degree of hearing loss. While we may not be able to control every factor that may lead to difficulty with communication, there are Good Communication Strategies that we can all use in our day-to-day lives. Communication takes both parties working together for it to be successful. Tips as a Listener:   1. Control your environment. It is important to limit the amount of background noise in the room when possible. You should also consider having a good light source in the room to best see the other person. 2. Ask for clarification. Instead of saying \"What?\", you can use parts of what you heard to make a new question. For example, if you heard the word \"Thursday\" but not the rest of the week, you may ask \"What was that about Thursday? \" or \"What did you want to do Thursday? \". This shows the person talking that you are listening and will help them better explain what they are saying. 3. Be an advocate for yourself. If you are hearing but not understanding, tell the other person \"I can hear you, but I need you to slow down when you speak. \"  Or if someone is facing the other direction, say \"I cannot hear you when you are not looking at me when we talk. \"       Tips as a Talker:   - Sit or stand 3 to 6 feet away to maximize audibility         -- It is unrealistic to believe someone else will fully hear your message if you are speaking from across the room or in a different room in the house   - Stay at eye level to help with visual cues   - Make sure you have the persons attention before speaking   - Use facial expressions and gestures to accentuate your message   - Raise your voice slightly (do not scream)   - Speak slowly and distinctly   - Use short, simple sentences   - Rephrase your words if the person is having a hard time understanding you    - To avoid distortion, dont speak directly into a persons ear      Some additional items that may be helpful:   - Remain patient - this is important for both parties   - Write down items that still cannot be heard/understood. You may write with pen/paper or consider typing/texting on a cell phone or smart device. - If background noise is unavoidable, try to keep yourself in a good position in the room. By sitting at a saleh on the side of the restaurant (preferably a corner), it will be easier to communicate than if you were sitting at a table in the middle with background noise surrounding you. Keep yourself positioned away from music speakers or heavy foot traffic.   - If you have difficulty with the television, consider these options:      -- Use closed-captioning, which is a setting you can turn on that displays the spoken words in a written form on the screen. There may be a slight delay, but this can help fill in missing information. This can be especially helpful when watching programs with accented speech. -- Consider use of a sound bar or speakers that come from the front of the TV. With modern flat screen TVs, many of them have speakers that come out of the back of the device, which makes sound bounce off the wall behind it, then go into the room. Sound bars can allow the sound to go straight in your direction and can improve sound quality. -- Consider ear level devices to help improve the volume and/or sound quality of the program.  There are devices that work like headphones that you can adjust the volume for your ears while others can have the volume at a more comfortable level, such as \"TV Ears\". Most hearing aids have devices that allow them to connect directly to the TV and improve sound quality. Dizziness: Care Instructions  Your Care Instructions  Dizziness is the feeling of unsteadiness or fuzziness in your head.  It is different than having vertigo, which is a feeling that the room is spinning or that you are moving or falling. It is also different from lightheadedness, which is the feeling that you are about to faint. It can be hard to know what causes dizziness. Some people feel dizzy when they have migraine headaches. Sometimes bouts of flu can make you feel dizzy. Some medical conditions, such as heart problems or high blood pressure, can make you feel dizzy. Many medicines can cause dizziness, including medicines for high blood pressure, pain, or anxiety. If a medicine causes your symptoms, your doctor may recommend that you stop or change the medicine. If it is a problem with your heart, you may need medicine to help your heart work better. If there is no clear reason for your symptoms, your doctor may suggest watching and waiting for a while to see if the dizziness goes away on its own. Follow-up care is a key part of your treatment and safety. Be sure to make and go to all appointments, and call your doctor if you are having problems. It's also a good idea to know your test results and keep a list of the medicines you take. How can you care for yourself at home? · If your doctor recommends or prescribes medicine, take it exactly as directed. Call your doctor if you think you are having a problem with your medicine. · Do not drive while you feel dizzy. · Try to prevent falls. Steps you can take include:  ? Using nonskid mats, adding grab bars near the tub, and using night-lights. ? Clearing your home so that walkways are free of anything you might trip on.  ? Letting family and friends know that you have been feeling dizzy. This will help them know how to help you. When should you call for help? Call 911 anytime you think you may need emergency care. For example, call if:    · You passed out (lost consciousness). · You have dizziness along with symptoms of a heart attack. These may include:  ? Chest pain or pressure, or a strange feeling in the chest.  ? Sweating. ?  Shortness of breath. ? Nausea or vomiting. ? Pain, pressure, or a strange feeling in the back, neck, jaw, or upper belly or in one or both shoulders or arms. ? Lightheadedness or sudden weakness. ? A fast or irregular heartbeat. · You have symptoms of a stroke. These may include:  ? Sudden numbness, tingling, weakness, or loss of movement in your face, arm, or leg, especially on only one side of your body. ? Sudden vision changes. ? Sudden trouble speaking. ? Sudden confusion or trouble understanding simple statements. ? Sudden problems with walking or balance. ? A sudden, severe headache that is different from past headaches. Call your doctor now or seek immediate medical care if:    · You feel dizzy and have a fever, headache, or ringing in your ears. · You have new or increased nausea and vomiting. · Your dizziness does not go away or comes back. Watch closely for changes in your health, and be sure to contact your doctor if:    · You do not get better as expected. Where can you learn more? Go to https://[employer].OneBuck[employer]ume. org and sign in to your [employer] account. Enter J064 in the EdPuzzle box to learn more about \"Dizziness: Care Instructions. \"     If you do not have an account, please click on the \"Sign Up Now\" link. Current as of: September 23, 2018  Content Version: 11.9  © 8348-1263 [employer]. Care instructions adapted under license by [facility] ([facility]). If you have questions about a medical condition or this instruction, always ask your healthcare professional. [place] [address] any warranty or liability for your use of this information.

## 2021-04-27 NOTE — PROGRESS NOTES
[facility]  [facility], [address] [place] [address] [place]  P: [phone]  F: [phone]       Patient     Chelly Lainez  1970    ChiefComplaint     Chief Complaint   Patient presents with    New Patient     Patient is here today because she is having episodes of dizziness and the feeling of off balance she has also experience memory loss on confusion, her dizziness will starte getting worse as the day progresses, she has had MRI and blood work done, this start on February 26       History of Present Illness     Chelly Lainez is a pleasant 52 y.o. female who presents as a new patient today for dizziness. Dizziness began in February. She describes her dizzy sensation as a drunk, rocking sensation. The first 3 days of her dizziness were the worst.  She had associated nausea. She tried to do a somersault to treat possible BPPV and had an episode of emesis. Throughout the past couple months, the initial vertigo symptoms have slightly improved. However she does have undulating symptoms of nausea, visual scintillating scotoma, aural fullness, brain fog. She does notice symptoms sometimes get worse after she eats. She denies any associated auditory symptoms including fluctuation of hearing, tinnitus, otorrhea, otalgia. Denies a history of ear infections or ear surgeries. She first noticed the scintillating scotoma about 3 to 4 years ago. She stopped birth control medication about 5 years ago. Her cycles have become more irregular over the past 6 months. Denies a history of migraine in the past.  Denies any syncopal events. She underwent MRI of the brain and a CTA of the neck which were essentially normal.  She has seen neurology who suspected a peripheral vestibulopathy versus MS. She has taken Antivert with minimal relief. She does have a history of cervical spine degenerative disc disease. She has been experiencing some intermittent numbness of both arms as well.     Past Medical History     Past Medical History:   Diagnosis Date    Anxiety        Past Surgical History     Past Surgical History:   Procedure Laterality Date     SECTION      DILATION AND CURETTAGE OF UTERUS      KNEE ARTHROSCOPY      SHOULDER SURGERY      Left shoulder       Family History     Family History   Problem Relation Age of Onset    Cancer Mother         colon    Cancer Father         gastric       Social History     Social History     Socioeconomic History    Marital status:      Spouse name: Not on file    Number of children: Not on file    Years of education: Not on file    Highest education level: Not on file   Occupational History    Not on file   Social Needs    Financial resource strain: Not on file    Food insecurity     Worry: Not on file     Inability: Not on file    Transportation needs     Medical: Not on file     Non-medical: Not on file   Tobacco Use    Smoking status: Never Smoker    Smokeless tobacco: Never Used   Substance and Sexual Activity    Alcohol use: Yes     Comment: occasional     Drug use: Never    Sexual activity: Not on file   Lifestyle    Physical activity     Days per week: Not on file     Minutes per session: Not on file    Stress: Not on file   Relationships    Social connections     Talks on phone: Not on file     Gets together: Not on file     Attends Baptism service: Not on file     Active member of club or organization: Not on file     Attends meetings of clubs or organizations: Not on file     Relationship status: Not on file    Intimate partner violence     Fear of current or ex partner: Not on file     Emotionally abused: Not on file     Physically abused: Not on file     Forced sexual activity: Not on file   Other Topics Concern    Not on file   Social History Narrative    Not on file       Allergies     Allergies   Allergen Reactions    Codeine      nausea       Medications     Current Outpatient Medications   Medication Sig Dispense Refill    ibuprofen (ADVIL;MOTRIN) 200 MG tablet Take 400 mg by mouth every 6 hours as needed for Pain      meclizine (ANTIVERT) 25 MG tablet Take 1 tablet by mouth 3 times daily as needed for Dizziness 20 tablet 0     No current facility-administered medications for this visit. Review of Systems     Review of Systems   Constitutional: Negative for activity change, appetite change, chills, diaphoresis, fatigue, fever and unexpected weight change. HENT: Negative for congestion, dental problem, drooling, ear discharge, ear pain, facial swelling, hearing loss, mouth sores, nosebleeds, postnasal drip, rhinorrhea, sinus pressure, sinus pain, sneezing, sore throat, tinnitus, trouble swallowing and voice change. Eyes: Negative for photophobia, discharge, itching and visual disturbance. Respiratory: Negative for cough, choking, shortness of breath, wheezing and stridor. Gastrointestinal: Negative for blood in stool, constipation, diarrhea, nausea and vomiting. Endocrine: Negative for cold intolerance, heat intolerance, polyphagia and polyuria. Musculoskeletal: Negative for back pain, gait problem, neck pain and neck stiffness. Skin: Negative for color change, pallor, rash and wound. Neurological: Positive for dizziness. Negative for syncope, facial asymmetry, speech difficulty, light-headedness, numbness and headaches. Hematological: Negative for adenopathy. Does not bruise/bleed easily. Psychiatric/Behavioral: Negative for agitation, confusion and sleep disturbance. PhysicalExam     Vitals:    04/27/21 1107   BP: 114/81   Pulse: 105   Temp: 98.8 °F (37.1 °C)       Physical Exam  Constitutional:       Appearance: She is well-developed. HENT:      Head: Normocephalic and atraumatic. Not macrocephalic and not microcephalic. No raccoon eyes, Dykes's sign, abrasion, contusion, right periorbital erythema, left periorbital erythema or laceration.  Hair is normal.      Jaw: No trismus. Right Ear: Hearing, tympanic membrane and external ear normal. No decreased hearing noted. No drainage, swelling or tenderness. No middle ear effusion. No mastoid tenderness. Tympanic membrane is not perforated, retracted or bulging. Tympanic membrane has normal mobility. Left Ear: Hearing, tympanic membrane and external ear normal. No decreased hearing noted. No drainage, swelling or tenderness. No middle ear effusion. No mastoid tenderness. Tympanic membrane is not perforated, retracted or bulging. Tympanic membrane has normal mobility. Nose: No nasal deformity, septal deviation, laceration, mucosal edema or rhinorrhea. Right Sinus: No maxillary sinus tenderness or frontal sinus tenderness. Left Sinus: No maxillary sinus tenderness or frontal sinus tenderness. Mouth/Throat:      Mouth: Mucous membranes are not pale, not dry and not cyanotic. No lacerations or oral lesions. Dentition: Normal dentition. No dental caries or dental abscesses. Pharynx: Uvula midline. No oropharyngeal exudate, posterior oropharyngeal erythema or uvula swelling. Tonsils: No tonsillar abscesses. Eyes:      General: Lids are normal.         Right eye: No discharge. Left eye: No discharge. Extraocular Movements:      Right eye: Normal extraocular motion and no nystagmus. Left eye: Normal extraocular motion and no nystagmus. Conjunctiva/sclera:      Right eye: No chemosis or exudate. Left eye: No chemosis or exudate. Neck:      Musculoskeletal: Neck supple. Thyroid: No thyroid mass or thyromegaly. Vascular: Normal carotid pulses. Trachea: No tracheal tenderness or tracheal deviation. Cardiovascular:      Rate and Rhythm: Normal rate and regular rhythm. Pulmonary:      Effort: No tachypnea, bradypnea or respiratory distress. Breath sounds: No stridor. Musculoskeletal:      Right shoulder: She exhibits normal range of motion. Lymphadenopathy:      Head:      Right side of head: No submental, submandibular, tonsillar, preauricular, posterior auricular or occipital adenopathy. Left side of head: No submental, submandibular, tonsillar, preauricular, posterior auricular or occipital adenopathy. Cervical: No cervical adenopathy. Right cervical: No superficial, deep or posterior cervical adenopathy. Left cervical: No superficial, deep or posterior cervical adenopathy. Skin:     General: Skin is warm and dry. Findings: No bruising, erythema, laceration, lesion or rash. Neurological:      Mental Status: She is alert and oriented to person, place, and time. Cranial Nerves: No cranial nerve deficit. Comments: [place]-Hallpike negative bilaterally. Romberg normal.  Fukuda step test normal.  Finger-nose normal.  Cranial nerves II to XII grossly intact. Psychiatric:         Speech: Speech normal.         Behavior: Behavior normal.           Procedure     Otomicroscopy    An operating microscope was utilized to visualize the external auditory canals using a 4mm speculum. The external auditory canals are clear. The tympanic membrane is intact. Ossicles appear intact. No fluid visualized in the middle ear. Assessment and Plan     1. Dizziness  Outside MRI of the brain images and report independently reviewed. MRI reveals no evidence of vestibular schwannoma. Audiogram performed in the office today. Audiometry reveals normal hearing bilaterally, type a tympanograms and excellent word recognition scores. The patient's combination of symptoms including visual scintillating scotoma, nausea, emesis, vertigo, brain fog are most consistent with vestibular migraine. I suspect that the symptoms are becoming apparent given her perimenopausal status. I provided education materials for the patient. I provided her with migraine diet handout.   Provided her with a food and symptom journal.  Over the next 2 months, I encouraged her to use these tools to help identify triggers and possibly avoid them. If after 2 months, she is experiencing persistent symptoms, will likely initiate preventative migraine medication such as nortriptyline. Also do have concern that she may have a cervicogenic component to her headaches, especially given her cervical degenerative disc disease. She may benefit from physiatry or orthopedic surgery consultation. If after follow-up and trial of medication she is having persistent issues with her neck we will pursue this avenue. 2. Vestibular migraine        Return in about 2 months (around 6/27/2021). Portions of this note were dictated using Dragon.  There may be linguistic errors secondary to the use of this program.

## 2021-07-20 ENCOUNTER — OFFICE VISIT (OUTPATIENT)
Dept: ENT CLINIC | Age: 51
End: 2021-07-20
Payer: COMMERCIAL

## 2021-07-20 VITALS
WEIGHT: 151 LBS | HEIGHT: 63 IN | DIASTOLIC BLOOD PRESSURE: 77 MMHG | TEMPERATURE: 99 F | SYSTOLIC BLOOD PRESSURE: 109 MMHG | HEART RATE: 98 BPM | BODY MASS INDEX: 26.75 KG/M2

## 2021-07-20 DIAGNOSIS — G43.809 VESTIBULAR MIGRAINE: Primary | ICD-10-CM

## 2021-07-20 PROCEDURE — 99214 OFFICE O/P EST MOD 30 MIN: CPT | Performed by: OTOLARYNGOLOGY

## 2021-07-20 RX ORDER — NORTRIPTYLINE HYDROCHLORIDE 10 MG/1
CAPSULE ORAL
Qty: 100 CAPSULE | Refills: 0 | Status: SHIPPED | OUTPATIENT
Start: 2021-07-20 | End: 2021-09-01 | Stop reason: ALTCHOICE

## 2021-07-20 ASSESSMENT — ENCOUNTER SYMPTOMS
RHINORRHEA: 0
NAUSEA: 0
SHORTNESS OF BREATH: 0
PHOTOPHOBIA: 0
EYE ITCHING: 0
STRIDOR: 0
SORE THROAT: 0
COUGH: 0
VOICE CHANGE: 0
SINUS PRESSURE: 0
TROUBLE SWALLOWING: 0
EYE REDNESS: 0
COLOR CHANGE: 0
FACIAL SWELLING: 0
CHOKING: 0
EYE PAIN: 0
DIARRHEA: 0
SINUS PAIN: 0

## 2021-07-20 NOTE — PROGRESS NOTES
[facility]  [address], [address] [facility], [address]  P: [phone]  F: [phone]       Patient     Aurelio Nieto  1970    ChiefComplaint     Chief Complaint   Patient presents with    Follow-up     Patient is here today for her follow with vertigo, the dizziness is getting better still has nausea what she does get dizzy and she seems to be catching the symptoms before they happen       History of Present Illness     Aurelio Nieto is a pleasant 52 y.o. female here for 2-month follow-up for vestibular migraine. Patient states she thinks she is identified a few triggers, stress, lack of sleep, caffeine and sugar. She has decreased her intake of caffeine and sugar. She is working on improving her sleep. Her symptoms overall have improved about 40%. She takes Antivert as needed for when she notices symptoms getting worse. This typically helps. She is interested in trying some type of preventative medication at this time.     Past Medical History     Past Medical History:   Diagnosis Date    Anxiety        Past Surgical History     Past Surgical History:   Procedure Laterality Date     SECTION      DILATION AND CURETTAGE OF UTERUS      KNEE ARTHROSCOPY      SHOULDER SURGERY      Left shoulder       Family History     Family History   Problem Relation Age of Onset    Cancer Mother         colon    Cancer Father         gastric       Social History     Social History     Socioeconomic History    Marital status:      Spouse name: Not on file    Number of children: Not on file    Years of education: Not on file    Highest education level: Not on file   Occupational History    Not on file   Tobacco Use    Smoking status: Never Smoker    Smokeless tobacco: Never Used   Vaping Use    Vaping Use: Never used   Substance and Sexual Activity    Alcohol use: Yes     Comment: occasional     Drug use: Never    Sexual activity: Not on file   Other Topics Concern    Not on file   Social History Narrative    Not on file     Social Determinants of Health     Financial Resource Strain:     Difficulty of Paying Living Expenses:    Food Insecurity:     Worried About Running Out of Food in the Last Year:     [address] in the Last Year:    Transportation Needs:     Lack of Transportation (Medical):  Lack of Transportation (Non-Medical):    Physical Activity:     Days of Exercise per Week:     Minutes of Exercise per Session:    Stress:     Feeling of Stress :    Social Connections:     Frequency of Communication with Friends and Family:     Frequency of Social Gatherings with Friends and Family:     Attends Hinduism Services:     Active Member of Clubs or Organizations:     Attends Club or Organization Meetings:     Marital Status:    Intimate Partner Violence:     Fear of Current or Ex-Partner:     Emotionally Abused:     Physically Abused:     Sexually Abused: Allergies     Allergies   Allergen Reactions    Codeine      nausea       Medications     Current Outpatient Medications   Medication Sig Dispense Refill    nortriptyline (PAMELOR) 10 MG capsule 1st week: x1 10 mg tablet nightly; 2nd week: x2 10 mg tablets nightly; 3rd week: x3 10 mg tabs nightly; 4th week: x4 10 mg tabs nightly 100 capsule 0    ibuprofen (ADVIL;MOTRIN) 200 MG tablet Take 400 mg by mouth every 6 hours as needed for Pain      meclizine (ANTIVERT) 25 MG tablet Take 1 tablet by mouth 3 times daily as needed for Dizziness 20 tablet 0     No current facility-administered medications for this visit. Review of Systems     Review of Systems   Constitutional: Negative for chills, fatigue and fever. HENT: Negative for congestion, ear discharge, ear pain, facial swelling, hearing loss, nosebleeds, postnasal drip, rhinorrhea, sinus pressure, sinus pain, sneezing, sore throat, tinnitus, trouble swallowing and voice change.     Eyes: Negative for photophobia, pain, Vestibular migraine  Patient achieved approximately 40% symptom improvement with trigger management. Interested in trying migraine preventative medication. Will begin nortriptyline given her difficulty sleeping. Proper administration and side effects of medication discussed with patient. Follow-up 4 weeks. Call with any questions, concerns or side effects. Counseled the patient that symptoms can get worse before improvement is noticed. - nortriptyline (PAMELOR) 10 MG capsule; 1st week: x1 10 mg tablet nightly; 2nd week: x2 10 mg tablets nightly; 3rd week: x3 10 mg tabs nightly; 4th week: x4 10 mg tabs nightly  Dispense: 100 capsule; Refill: 0      Return in about 4 weeks (around 8/17/2021). Portions of this note were dictated using Dragon.  There may be linguistic errors secondary to the use of this program.

## 2021-08-17 ENCOUNTER — OFFICE VISIT (OUTPATIENT)
Dept: ENT CLINIC | Age: 51
End: 2021-08-17
Payer: COMMERCIAL

## 2021-08-17 VITALS
BODY MASS INDEX: 26.58 KG/M2 | HEART RATE: 117 BPM | WEIGHT: 150 LBS | DIASTOLIC BLOOD PRESSURE: 74 MMHG | SYSTOLIC BLOOD PRESSURE: 102 MMHG | TEMPERATURE: 98.2 F | HEIGHT: 63 IN

## 2021-08-17 DIAGNOSIS — G43.809 VESTIBULAR MIGRAINE: Primary | ICD-10-CM

## 2021-08-17 PROCEDURE — 99214 OFFICE O/P EST MOD 30 MIN: CPT | Performed by: OTOLARYNGOLOGY

## 2021-08-17 RX ORDER — NORTRIPTYLINE HYDROCHLORIDE 50 MG/1
50 CAPSULE ORAL NIGHTLY
Qty: 30 CAPSULE | Refills: 3 | Status: SHIPPED | OUTPATIENT
Start: 2021-08-17 | End: 2021-11-29 | Stop reason: SDUPTHER

## 2021-08-17 ASSESSMENT — ENCOUNTER SYMPTOMS
FACIAL SWELLING: 0
COUGH: 0
EYE PAIN: 0
RHINORRHEA: 0
EYE REDNESS: 0
PHOTOPHOBIA: 0
TROUBLE SWALLOWING: 0
NAUSEA: 0
STRIDOR: 0
DIARRHEA: 0
VOICE CHANGE: 0
SHORTNESS OF BREATH: 0
CHOKING: 0
SINUS PAIN: 0
SORE THROAT: 0
COLOR CHANGE: 0
SINUS PRESSURE: 0
EYE ITCHING: 0

## 2021-08-17 NOTE — PROGRESS NOTES
[facility]  [address], [address] [facility], [address] [place] [address]  P: [phone]  F: [phone]       Patient     Arabella Cervantes  1970    ChiefComplaint     Chief Complaint   Patient presents with    Follow-up     Patient is here today for her follow up visit for vertigo, she is doing well and has not had a severe incident of dizziness since her last visit       History of Present Illness     Arabella Cervantes is a pleasant 52 y.o. female here for 1 month follow-up for vestibular migraine. Patient has been titrating up her nortriptyline dose. Currently on 40 mg at night. She did experience some irritability specifically around weeks 2. She feels the migraine symptoms have improved. The dizziness is improving.     Past Medical History     Past Medical History:   Diagnosis Date    Anxiety        Past Surgical History     Past Surgical History:   Procedure Laterality Date     SECTION      DILATION AND CURETTAGE OF UTERUS      KNEE ARTHROSCOPY      SHOULDER SURGERY      Left shoulder       Family History     Family History   Problem Relation Age of Onset    Cancer Mother         colon    Cancer Father         gastric       Social History     Social History     Socioeconomic History    Marital status:      Spouse name: Not on file    Number of children: Not on file    Years of education: Not on file    Highest education level: Not on file   Occupational History    Not on file   Tobacco Use    Smoking status: Never Smoker    Smokeless tobacco: Never Used   Vaping Use    Vaping Use: Never used   Substance and Sexual Activity    Alcohol use: Yes     Comment: occasional     Drug use: Never    Sexual activity: Not on file   Other Topics Concern    Not on file   Social History Narrative    Not on file     Social Determinants of Health     Financial Resource Strain:     Difficulty of Paying Living Expenses:    Food Insecurity:     Worried About Running Out of Food in the Last Year:    [address] in the Last Year:    Transportation Needs:     Lack of Transportation (Medical):  Lack of Transportation (Non-Medical):    Physical Activity:     Days of Exercise per Week:     Minutes of Exercise per Session:    Stress:     Feeling of Stress :    Social Connections:     Frequency of Communication with Friends and Family:     Frequency of Social Gatherings with Friends and Family:     Attends Worship Services:     Active Member of Clubs or Organizations:     Attends Club or Organization Meetings:     Marital Status:    Intimate Partner Violence:     Fear of Current or Ex-Partner:     Emotionally Abused:     Physically Abused:     Sexually Abused: Allergies     Allergies   Allergen Reactions    Codeine      nausea       Medications     Current Outpatient Medications   Medication Sig Dispense Refill    nortriptyline (PAMELOR) 50 MG capsule Take 1 capsule by mouth nightly 30 capsule 3    nortriptyline (PAMELOR) 10 MG capsule 1st week: x1 10 mg tablet nightly; 2nd week: x2 10 mg tablets nightly; 3rd week: x3 10 mg tabs nightly; 4th week: x4 10 mg tabs nightly 100 capsule 0    ibuprofen (ADVIL;MOTRIN) 200 MG tablet Take 400 mg by mouth every 6 hours as needed for Pain      meclizine (ANTIVERT) 25 MG tablet Take 1 tablet by mouth 3 times daily as needed for Dizziness 20 tablet 0     No current facility-administered medications for this visit. Review of Systems     Review of Systems   Constitutional: Negative for chills, fatigue and fever. HENT: Negative for congestion, ear discharge, ear pain, facial swelling, hearing loss, nosebleeds, postnasal drip, rhinorrhea, sinus pressure, sinus pain, sneezing, sore throat, tinnitus, trouble swallowing and voice change. Eyes: Negative for photophobia, pain, redness, itching and visual disturbance. Respiratory: Negative for cough, choking, shortness of breath and stridor.     Gastrointestinal: Negative for diarrhea and nausea. Musculoskeletal: Negative for neck pain and neck stiffness. Skin: Negative for color change and rash. Neurological: Negative for dizziness, facial asymmetry and light-headedness. Hematological: Negative for adenopathy. Psychiatric/Behavioral: Negative for agitation and confusion. PhysicalExam     Vitals:    08/17/21 1045   BP: 102/74   Pulse: 117   Temp: 98.2 °F (36.8 °C)       Physical Exam  Constitutional:       Appearance: She is well-developed. HENT:      Head: Normocephalic and atraumatic. Jaw: No trismus. Right Ear: Tympanic membrane, ear canal and external ear normal. No drainage. No middle ear effusion. Tympanic membrane is not perforated. Left Ear: Tympanic membrane, ear canal and external ear normal. No drainage. No middle ear effusion. Tympanic membrane is not perforated. Nose: No septal deviation, mucosal edema or rhinorrhea. Mouth/Throat:      Dentition: Normal dentition. Pharynx: Uvula midline. No oropharyngeal exudate. Eyes:      General: No scleral icterus. Right eye: No discharge. Left eye: No discharge. Pupils: Pupils are equal, round, and reactive to light. Neck:      Thyroid: No thyromegaly. Trachea: Phonation normal. No tracheal deviation. Pulmonary:      Effort: Pulmonary effort is normal. No respiratory distress. Breath sounds: No stridor. Musculoskeletal:      Cervical back: Neck supple. Lymphadenopathy:      Cervical: No cervical adenopathy. Skin:     General: Skin is warm and dry. Neurological:      Mental Status: She is alert and oriented to person, place, and time. Cranial Nerves: No cranial nerve deficit. Psychiatric:         Behavior: Behavior normal.           Procedure           Assessment and Plan     1. Vestibular migraine  Patient noting improved symptoms of dizziness with titration of nortriptyline. We will begin 50 mg nightly this weekend.   Have her follow-up in 4 to 6 weeks to assess symptom improvement. If in the meantime there are any side effects she should call. - nortriptyline (PAMELOR) 50 MG capsule; Take 1 capsule by mouth nightly  Dispense: 30 capsule; Refill: 3      Return in about 4 weeks (around 9/14/2021). Portions of this note were dictated using Dragon.  There may be linguistic errors secondary to the use of this program. no abrasions, no jaundice, no lesions, no pruritis, and no rashes.

## 2021-08-31 ENCOUNTER — TELEPHONE (OUTPATIENT)
Dept: ENT CLINIC | Age: 51
End: 2021-08-31

## 2021-08-31 NOTE — TELEPHONE ENCOUNTER
If she can tolerate it, I would like to give it a couple more weeks. Symptoms typically worsen before improving on this medication. If she cannot tolerate, we will begin decreasing dose.

## 2021-08-31 NOTE — TELEPHONE ENCOUNTER
She is starting to have the same symptoms that she was having prior to when she started taking the Nortriptyline. Lightheadness, off-balance not Vertigo dizzy. She feels disconnected. The symptoms that started while she was increasing the dose seems to be getting worse instead of better. Feeling like she is on edge, and worrying about things more. The heartburn has gotten better.

## 2021-09-01 ENCOUNTER — TELEPHONE (OUTPATIENT)
Dept: PRIMARY CARE CLINIC | Age: 51
End: 2021-09-01

## 2021-09-01 ENCOUNTER — OFFICE VISIT (OUTPATIENT)
Dept: PRIMARY CARE CLINIC | Age: 51
End: 2021-09-01
Payer: COMMERCIAL

## 2021-09-01 VITALS
BODY MASS INDEX: 26.82 KG/M2 | TEMPERATURE: 98.1 F | HEIGHT: 63 IN | WEIGHT: 151.4 LBS | DIASTOLIC BLOOD PRESSURE: 82 MMHG | HEART RATE: 105 BPM | SYSTOLIC BLOOD PRESSURE: 122 MMHG | OXYGEN SATURATION: 98 %

## 2021-09-01 DIAGNOSIS — Z01.818 PRE-OP EXAM: Primary | ICD-10-CM

## 2021-09-01 DIAGNOSIS — J45.909 ASTHMA DUE TO SEASONAL ALLERGIES: Primary | ICD-10-CM

## 2021-09-01 DIAGNOSIS — B37.2 YEAST DERMATITIS: ICD-10-CM

## 2021-09-01 PROBLEM — K63.5 POLYP OF TRANSVERSE COLON: Status: ACTIVE | Noted: 2017-12-08

## 2021-09-01 PROCEDURE — 99213 OFFICE O/P EST LOW 20 MIN: CPT | Performed by: NURSE PRACTITIONER

## 2021-09-01 RX ORDER — ALBUTEROL SULFATE 90 UG/1
2 AEROSOL, METERED RESPIRATORY (INHALATION) 4 TIMES DAILY PRN
Qty: 18 G | Refills: 1 | Status: CANCELLED | OUTPATIENT
Start: 2021-09-01

## 2021-09-01 RX ORDER — ALBUTEROL SULFATE 90 UG/1
2 AEROSOL, METERED RESPIRATORY (INHALATION) EVERY 6 HOURS PRN
Qty: 18 G | Refills: 3 | Status: SHIPPED | OUTPATIENT
Start: 2021-09-01

## 2021-09-01 RX ORDER — NYSTATIN 100000 [USP'U]/G
POWDER TOPICAL
Qty: 15 G | Refills: 0 | Status: SHIPPED | OUTPATIENT
Start: 2021-09-01

## 2021-09-01 SDOH — ECONOMIC STABILITY: FOOD INSECURITY: WITHIN THE PAST 12 MONTHS, THE FOOD YOU BOUGHT JUST DIDN'T LAST AND YOU DIDN'T HAVE MONEY TO GET MORE.: NEVER TRUE

## 2021-09-01 SDOH — ECONOMIC STABILITY: FOOD INSECURITY: WITHIN THE PAST 12 MONTHS, YOU WORRIED THAT YOUR FOOD WOULD RUN OUT BEFORE YOU GOT MONEY TO BUY MORE.: NEVER TRUE

## 2021-09-01 ASSESSMENT — SOCIAL DETERMINANTS OF HEALTH (SDOH): HOW HARD IS IT FOR YOU TO PAY FOR THE VERY BASICS LIKE FOOD, HOUSING, MEDICAL CARE, AND HEATING?: NOT VERY HARD

## 2021-09-01 NOTE — PROGRESS NOTES
Preoperative Consultation      Raul Jackson  YOB: 1970    Date of Service:  9/1/2021    Vitals:    09/01/21 0934   BP: 122/82   Site: Left Upper Arm   Position: Sitting   Cuff Size: Medium Adult   Pulse: 105   Temp: 98.1 °F (36.7 °C)   TempSrc: Temporal   SpO2: 98%   Weight: 151 lb 6.4 oz (68.7 kg)   Height: 5' 3\" (1.6 m)      Wt Readings from Last 2 Encounters:   09/01/21 151 lb 6.4 oz (68.7 kg)   08/17/21 150 lb (68 kg)     BP Readings from Last 3 Encounters:   09/01/21 122/82   08/17/21 102/74   07/20/21 109/77        Chief Complaint   Patient presents with    Pre-op Exam     Pt is having a R shoulder scope on 9/15/21 at the [facility]. Allergies   Allergen Reactions    Codeine      nausea     Outpatient Medications Marked as Taking for the 9/1/21 encounter (Office Visit) with ALEXIS Nugent CNP   Medication Sig Dispense Refill    nortriptyline (PAMELOR) 50 MG capsule Take 1 capsule by mouth nightly 30 capsule 3    ibuprofen (ADVIL;MOTRIN) 200 MG tablet Take 400 mg by mouth every 6 hours as needed for Pain         HPI  This patient presents to the office today for a preoperative consultation at the request of surgeon, Dr. Gera Irving, with [facility] who plans on performing R shoulder scope on September 15 2021 at [address] at [facility]. The current problem began fell May 2021 ago, and symptoms have been unchanged with time. Conservative therapy: No.    Planned anesthesia: General and IV sedation   Known anesthesia problems: some n/v of note- h/o needing Albuterol inhaler d/t seasonal allergies, w/o dx of Asthma  Bleeding risk: No recent or remote history of abnormal bleeding  Personal or FH of DVT/PE: No    Patient objection to receiving blood products: No    There is no problem list on file for this patient.       Past Medical History:   Diagnosis Date    Anxiety     Migraines     Seasonal allergies      Past Surgical History: Procedure Laterality Date     SECTION      DILATION AND CURETTAGE OF UTERUS      KNEE ARTHROSCOPY      SHOULDER SURGERY      Left shoulder     Family History   Problem Relation Age of Onset    Cancer Mother         colon    Cancer Father         gastric       Review of Systems  A comprehensive review of systems was negative except for what was noted in the HPI. Physical Exam   Constitutional: She is oriented to person, place, and time. She appears well-developed and well-nourished. No distress. HENT:   Head: Normocephalic and atraumatic. Mouth/Throat: Uvula is midline, oropharynx is clear and moist and mucous membranes are normal.   Eyes: Conjunctivae and EOM are normal. Pupils are equal, round, and reactive to light. Neck: Trachea normal and normal range of motion. Neck supple. No JVD present. Carotid bruit is not present. No mass and no thyromegaly present. Cardiovascular: Normal rate, regular rhythm, normal heart sounds and intact distal pulses. Exam reveals no gallop and no friction rub. No murmur heard. Pulmonary/Chest: Effort normal and breath sounds normal. No respiratory distress. She has no wheezes. She has no rales. Abdominal: Soft. Normal aorta and bowel sounds are normal. She exhibits no distension and no mass. There is no hepatosplenomegaly. No tenderness. Musculoskeletal: She exhibits no edema and no tenderness. Neurological: She is alert and oriented to person, place, and time. She has normal strength. No cranial nerve deficit or sensory deficit. Coordination and gait normal.   Skin: Skin is warm and dry. No rash noted. No erythema. Psychiatric: She has a normal mood and affect. Her behavior is normal.     EKG Interpretation:  Last EKG 2021 normal EKG, normal sinus rhythm.     Lab Review     CBC with Differential:    Lab Results   Component Value Date    WBC 6.7 2021    RBC 4.49 2021    HGB 14.2 2021    HCT 41.9 2021     2021 MCV 93.5 03/01/2021    MCH 31.7 03/01/2021    MCHC 33.9 03/01/2021    RDW 13.2 03/01/2021    LYMPHOPCT 29.6 03/01/2021    MONOPCT 4.4 03/01/2021    BASOPCT 0.8 03/01/2021    MONOSABS 0.3 03/01/2021    LYMPHSABS 2.0 03/01/2021    EOSABS 0.3 03/01/2021    BASOSABS 0.1 03/01/2021     CMP:    Lab Results   Component Value Date     04/08/2021    K 3.6 04/08/2021     04/08/2021    CO2 22 04/08/2021    BUN 21 04/08/2021    CREATININE 0.8 04/08/2021    GFRAA >60 04/08/2021    AGRATIO 1.8 03/01/2021    LABGLOM >60 04/08/2021    GLUCOSE 96 04/08/2021    PROT 7.4 03/01/2021    LABALBU 4.8 03/01/2021    CALCIUM 9.2 04/08/2021    BILITOT 0.7 03/01/2021    ALKPHOS 58 03/01/2021    AST 27 03/01/2021    ALT 49 03/01/2021     BMP:    Lab Results   Component Value Date     04/08/2021    K 3.6 04/08/2021     04/08/2021    CO2 22 04/08/2021    BUN 21 04/08/2021    LABALBU 4.8 03/01/2021    CREATININE 0.8 04/08/2021    CALCIUM 9.2 04/08/2021    GFRAA >60 04/08/2021    LABGLOM >60 04/08/2021    GLUCOSE 96 04/08/2021           Assessment:       52 y.o. patient with planned surgery as above. Known risk factors for perioperative complications: None  Current medications which may produce withdrawal symptoms if withheld perioperatively: none      Plan:     1. Preoperative workup as follows: none    2. Change in medication regimen before surgery: None  Hold aspirin, ibuprofen, aleve, naprosyn, other NSAIDS, or products containing these medications for 7 days before surgery. Hold fish oil, and vitamin E  OK to take multiple vitamin  OK to take tylenol  Nothing to eat of drink after midnight before surgery. However Pt is instructed to follow Surgeons recommendation for preoperative medication regimen.       3. Prophylaxis for cardiac events with perioperative beta-blockers: Not indicated  ACC/AHA indications for pre-operative beta-blocker use:    · Vascular surgery with history of postitive stress test  · Intermediate or high risk surgery with history of CAD   · Intermediate or high risk surgery with multiple clinical predictors of CAD- 2 of the following: history of compensated or prior heart failure, history of cerebrovascular disease, DM, or renal insufficiency    Routine administration of higher-dose, long-acting metoprolol in beta-blockernaïve patients on the day of surgery, and in the absence of dose titration is associated with an overall increase in mortality.   Beta-blockers should be started days to weeks prior to surgery and titrated to pulse < 70.    4. Deep vein thrombosis prophylaxis: regimen to be chosen by surgical team    No contraindications to planned surgery

## 2021-09-01 NOTE — TELEPHONE ENCOUNTER
Pt complaining of rash below Breast/ folds of breast, over-the-counter hydrocortisone with some avail. Patient reports rash as bright red. Will prescribe nystatin powder, also suggested over-the-counter topical creams for yeast.  Patient is also requesting refill of albuterol inhaler, she reports suffers from seasonal allergies without the diagnosis of asthma will sometimes need albuterol inhaler.   Will refill patient called if not helpful over-the-counter antihistamine such as Zyrtec or Claritin

## 2021-09-30 ENCOUNTER — OFFICE VISIT (OUTPATIENT)
Dept: ENT CLINIC | Age: 51
End: 2021-09-30
Payer: COMMERCIAL

## 2021-09-30 VITALS
DIASTOLIC BLOOD PRESSURE: 82 MMHG | WEIGHT: 151 LBS | HEIGHT: 63 IN | HEART RATE: 98 BPM | SYSTOLIC BLOOD PRESSURE: 118 MMHG | BODY MASS INDEX: 26.75 KG/M2

## 2021-09-30 DIAGNOSIS — G43.809 VESTIBULAR MIGRAINE: Primary | ICD-10-CM

## 2021-09-30 DIAGNOSIS — R42 DIZZINESS: ICD-10-CM

## 2021-09-30 PROCEDURE — 99213 OFFICE O/P EST LOW 20 MIN: CPT | Performed by: OTOLARYNGOLOGY

## 2021-09-30 ASSESSMENT — ENCOUNTER SYMPTOMS
DIARRHEA: 0
EYE PAIN: 0
CHOKING: 0
PHOTOPHOBIA: 0
FACIAL SWELLING: 0
RHINORRHEA: 0
COLOR CHANGE: 0
SHORTNESS OF BREATH: 0
SINUS PRESSURE: 0
NAUSEA: 0
VOICE CHANGE: 0
SORE THROAT: 0
STRIDOR: 0
SINUS PAIN: 0
TROUBLE SWALLOWING: 0
EYE REDNESS: 0
EYE ITCHING: 0
COUGH: 0

## 2021-09-30 NOTE — PROGRESS NOTES
[facility]  [address], [address] [place] [address] Ramón  P: [phone]  F: [phone]       Patient     Jovani Mederos  1970    ChiefComplaint     Chief Complaint   Patient presents with    Follow-up     Patient is here today for her 6 week follow up, she is doing well with no complaints or concerns       History of Present Illness     Jovani Mederos is a pleasant 54 y.o. female here for 1 month follow-up for vestibular migraine. She has been taking 50 mg of nortriptyline nightly. She states her symptoms are greater than 90% improved. She is not having any significant episodes of dizziness. Overall very satisfied with her symptom improvement.     Past Medical History     Past Medical History:   Diagnosis Date    Anxiety     Migraines     Seasonal allergies        Past Surgical History     Past Surgical History:   Procedure Laterality Date     SECTION      DILATION AND CURETTAGE OF UTERUS      KNEE ARTHROSCOPY      SHOULDER SURGERY      Left shoulder       Family History     Family History   Problem Relation Age of Onset    Cancer Mother         colon    Cancer Father         gastric       Social History     Social History     Socioeconomic History    Marital status:      Spouse name: Not on file    Number of children: Not on file    Years of education: Not on file    Highest education level: Not on file   Occupational History    Not on file   Tobacco Use    Smoking status: Never Smoker    Smokeless tobacco: Never Used   Vaping Use    Vaping Use: Never used   Substance and Sexual Activity    Alcohol use: Yes     Comment: occasional     Drug use: Never    Sexual activity: Not on file   Other Topics Concern    Not on file   Social History Narrative    Not on file     Social Determinants of Health     Financial Resource Strain: Low Risk     Difficulty of Paying Living Expenses: Not very hard   Food Insecurity: No Food Insecurity    Worried About Running Out of Food in the Last Year: Never true    Ran Out of Food in the Last Year: Never true   Transportation Needs:     Lack of Transportation (Medical):  Lack of Transportation (Non-Medical):    Physical Activity:     Days of Exercise per Week:     Minutes of Exercise per Session:    Stress:     Feeling of Stress :    Social Connections:     Frequency of Communication with Friends and Family:     Frequency of Social Gatherings with Friends and Family:     Attends [employer] Services:     Active Member of Clubs or Organizations:     Attends Club or Organization Meetings:     Marital Status:    Intimate Partner Violence:     Fear of Current or Ex-Partner:     Emotionally Abused:     Physically Abused:     Sexually Abused: Allergies     Allergies   Allergen Reactions    Codeine      nausea       Medications     Current Outpatient Medications   Medication Sig Dispense Refill    albuterol sulfate HFA (PROVENTIL HFA) 108 (90 Base) MCG/ACT inhaler Inhale 2 puffs into the lungs every 6 hours as needed for Wheezing 18 g 3    nystatin (MYCOSTATIN) 832650 UNIT/GM powder Apply 3 times daily. 15 g 0    nortriptyline (PAMELOR) 50 MG capsule Take 1 capsule by mouth nightly 30 capsule 3    ibuprofen (ADVIL;MOTRIN) 200 MG tablet Take 400 mg by mouth every 6 hours as needed for Pain       No current facility-administered medications for this visit. Review of Systems     Review of Systems   Constitutional: Negative for chills, fatigue and fever. HENT: Negative for congestion, ear discharge, ear pain, facial swelling, hearing loss, nosebleeds, postnasal drip, rhinorrhea, sinus pressure, sinus pain, sneezing, sore throat, tinnitus, trouble swallowing and voice change. Eyes: Negative for photophobia, pain, redness, itching and visual disturbance. Respiratory: Negative for cough, choking, shortness of breath and stridor. Gastrointestinal: Negative for diarrhea and nausea. improvement of her symptoms. I recommend a 3-month follow-up. After that we will likely have her follow-up every 6 months. 2. Dizziness        Return in about 3 months (around 12/30/2021). Portions of this note were dictated using Dragon.  There may be linguistic errors secondary to the use of this program.

## 2021-11-15 ENCOUNTER — CLINICAL DOCUMENTATION (OUTPATIENT)
Dept: OTHER | Age: 51
End: 2021-11-15

## 2021-11-29 DIAGNOSIS — G43.809 VESTIBULAR MIGRAINE: ICD-10-CM

## 2021-11-29 RX ORDER — NORTRIPTYLINE HYDROCHLORIDE 50 MG/1
50 CAPSULE ORAL NIGHTLY
Qty: 90 CAPSULE | Refills: 3 | Status: SHIPPED | OUTPATIENT
Start: 2021-11-29

## 2021-12-15 ENCOUNTER — CLINICAL DOCUMENTATION (OUTPATIENT)
Dept: OTHER | Age: 51
End: 2021-12-15

## 2021-12-30 ENCOUNTER — OFFICE VISIT (OUTPATIENT)
Dept: ENT CLINIC | Age: 51
End: 2021-12-30
Payer: COMMERCIAL

## 2021-12-30 VITALS
BODY MASS INDEX: 26.75 KG/M2 | WEIGHT: 151 LBS | HEIGHT: 63 IN | HEART RATE: 112 BPM | SYSTOLIC BLOOD PRESSURE: 134 MMHG | DIASTOLIC BLOOD PRESSURE: 90 MMHG

## 2021-12-30 DIAGNOSIS — G43.809 VESTIBULAR MIGRAINE: ICD-10-CM

## 2021-12-30 PROCEDURE — 99213 OFFICE O/P EST LOW 20 MIN: CPT | Performed by: OTOLARYNGOLOGY

## 2021-12-30 ASSESSMENT — ENCOUNTER SYMPTOMS
RHINORRHEA: 0
EYE PAIN: 0
NAUSEA: 0
VOICE CHANGE: 0
EYE REDNESS: 0
CHOKING: 0
SHORTNESS OF BREATH: 0
COLOR CHANGE: 0
COUGH: 0
SORE THROAT: 0
EYE ITCHING: 0
SINUS PRESSURE: 0
FACIAL SWELLING: 0
TROUBLE SWALLOWING: 0
SINUS PAIN: 0
STRIDOR: 0
DIARRHEA: 0
PHOTOPHOBIA: 0

## 2021-12-30 NOTE — PROGRESS NOTES
[facility]  [address], [address] [place] [address]  P: [phone]  F: [phone]       Patient     Diego Vogt  1970    ChiefComplaint     Chief Complaint   Patient presents with    Follow-up     Patient is here today for her 3 month follow up, she is doing well with no complaints or concerns       History of Present Illness     Diego Vogt is a pleasant 54 y.o. female here for 3-month follow-up for vestibular migraine. She has been continuing to take her nortriptyline 50 mg nightly. She is not experiencing any side effects. Her dizzy symptoms have significantly improved. She is overall very happy with symptom improvement.     Past Medical History     Past Medical History:   Diagnosis Date    Anxiety     Migraines     Seasonal allergies        Past Surgical History     Past Surgical History:   Procedure Laterality Date     SECTION      DILATION AND CURETTAGE OF UTERUS      KNEE ARTHROSCOPY      SHOULDER SURGERY      Left shoulder       Family History     Family History   Problem Relation Age of Onset    Cancer Mother         colon    Cancer Father         gastric       Social History     Social History     Socioeconomic History    Marital status:      Spouse name: Not on file    Number of children: Not on file    Years of education: Not on file    Highest education level: Not on file   Occupational History    Not on file   Tobacco Use    Smoking status: Never Smoker    Smokeless tobacco: Never Used   Vaping Use    Vaping Use: Never used   Substance and Sexual Activity    Alcohol use: Yes     Comment: occasional     Drug use: Never    Sexual activity: Not on file   Other Topics Concern    Not on file   Social History Narrative    Not on file     Social Determinants of Health     Financial Resource Strain: Low Risk     Difficulty of Paying Living Expenses: Not very hard   Food Insecurity: No Food Insecurity    Worried About Running Out of Food in the Last Year: Never true    [address] in the Last Year: Never true   Transportation Needs:     Lack of Transportation (Medical): Not on file    Lack of Transportation (Non-Medical): Not on file   Physical Activity:     Days of Exercise per Week: Not on file    Minutes of Exercise per Session: Not on file   Stress:     Feeling of Stress : Not on file   Social Connections:     Frequency of Communication with Friends and Family: Not on file    Frequency of Social Gatherings with Friends and Family: Not on file    Attends Yazdanism Services: Not on file    Active Member of [address] [place] [address] or Organizations: Not on file    Attends Club or Organization Meetings: Not on file    Marital Status: Not on file   Intimate Partner Violence:     Fear of Current or Ex-Partner: Not on file    Emotionally Abused: Not on file    Physically Abused: Not on file    Sexually Abused: Not on file   Housing Stability:     Unable to Pay for Housing in the Last Year: Not on file    Number of [place] in the Last Year: Not on file    Unstable Housing in the Last Year: Not on file       Allergies     Allergies   Allergen Reactions    Codeine      nausea       Medications     Current Outpatient Medications   Medication Sig Dispense Refill    nortriptyline (PAMELOR) 50 MG capsule Take 1 capsule by mouth nightly 90 capsule 3    albuterol sulfate HFA (PROVENTIL HFA) 108 (90 Base) MCG/ACT inhaler Inhale 2 puffs into the lungs every 6 hours as needed for Wheezing 18 g 3    nystatin (MYCOSTATIN) 738123 UNIT/GM powder Apply 3 times daily. 15 g 0    ibuprofen (ADVIL;MOTRIN) 200 MG tablet Take 400 mg by mouth every 6 hours as needed for Pain       No current facility-administered medications for this visit. Review of Systems     Review of Systems   Constitutional: Negative for chills, fatigue and fever.    HENT: Negative for congestion, ear discharge, ear pain, facial swelling, hearing loss, nosebleeds, postnasal drip, rhinorrhea, sinus pressure, sinus pain, sneezing, sore throat, tinnitus, trouble swallowing and voice change. Eyes: Negative for photophobia, pain, redness, itching and visual disturbance. Respiratory: Negative for cough, choking, shortness of breath and stridor. Gastrointestinal: Negative for diarrhea and nausea. Musculoskeletal: Negative for neck pain and neck stiffness. Skin: Negative for color change and rash. Neurological: Negative for dizziness, facial asymmetry and light-headedness. Hematological: Negative for adenopathy. Psychiatric/Behavioral: Negative for agitation and confusion. PhysicalExam     Vitals:    12/30/21 0922   BP: (!) 134/90   Pulse: 112       Physical Exam  Constitutional:       Appearance: She is well-developed. HENT:      Head: Normocephalic and atraumatic. Jaw: No trismus. Right Ear: Tympanic membrane, ear canal and external ear normal. No drainage. No middle ear effusion. Tympanic membrane is not perforated. Left Ear: Tympanic membrane, ear canal and external ear normal. No drainage. No middle ear effusion. Tympanic membrane is not perforated. Nose: No septal deviation, mucosal edema or rhinorrhea. Mouth/Throat:      Dentition: Normal dentition. Pharynx: Uvula midline. No oropharyngeal exudate. Eyes:      General: No scleral icterus. Right eye: No discharge. Left eye: No discharge. Pupils: Pupils are equal, round, and reactive to light. Neck:      Thyroid: No thyromegaly. Trachea: Phonation normal. No tracheal deviation. Pulmonary:      Effort: Pulmonary effort is normal. No respiratory distress. Breath sounds: No stridor. Musculoskeletal:      Cervical back: Neck supple. Lymphadenopathy:      Cervical: No cervical adenopathy. Skin:     General: Skin is warm and dry. Neurological:      Mental Status: She is alert and oriented to person, place, and time. Cranial Nerves:  No cranial nerve deficit. Psychiatric:         Behavior: Behavior normal.           Procedure           Assessment and Plan     1. Vestibular migraine  The patient's symptoms of dizziness from her vestibular migraine have nearly completely resolved with 50 mg of nortriptyline nightly. I offered her to increase the dosage however she feels that her symptom relief from the 50 mg is sufficient. At this time, I will defer further care for vestibular migraine to her primary care physician. She may follow-up with her PCP to renew her nortriptyline next year. Return if symptoms worsen or fail to improve. Portions of this note were dictated using Dragon.  There may be linguistic errors secondary to the use of this program.

## 2021-12-30 NOTE — LETTER
[address] [place], [place] [address] [address]  Phone: [phone]  Fax: [phone] [facility], DO    December 30, 2021     Lucita Freedman, ALEXIS - [facility]  [address] [address]    Patient: Beto He   MR Number: <[id number]>   YOB: 1970   Date of Visit: 12/30/2021       Dear Lucita Freedman: Thank you for referring Beto He to me for evaluation/treatment. Below are the relevant portions of my assessment and plan of care. If you have questions, please do not hesitate to call me. I look forward to following Janusz along with you.     Sincerely,      Silvana Castaneda, DO

## 2021-12-30 NOTE — LETTER
Endy Davis has been under my care for dizziness. We narrowed down the diagnosis to vestibular migraine. She has significant symptom control with 50 mg nortriptyline nightly. I gave her the option to follow-up with you for medication refills. If you are comfortable with this, you may provide her with the medication refills. If you have any questions or concerns let me know.     Sim Fill DO

## 2022-07-12 ENCOUNTER — PATIENT MESSAGE (OUTPATIENT)
Dept: PRIMARY CARE CLINIC | Age: 52
End: 2022-07-12

## 2022-07-12 NOTE — TELEPHONE ENCOUNTER
From: Beto He  To: Lucita Freedman  Sent: 7/12/2022 12:58 PM EDT  Subject: Foot pain    Hi Jaonne. I hope this text finds you well. Im reaching out because I fell and rolled the outside of my left foot under on a cement step while on a trip the week before Mothers Day. Aydeelistceferino Modesto been treating it for several weeks now. The last thing I tried was to wear a boot for 10 days and see if it improved. The swelling did go down but still having trouble with it. I contacted a live md dr through [facility] and they said I need to get it X-ray d to make sure there are no fractures but they cannot order it. So I am reaching out to see if i can get an order for X-rays of my left foot? Im available to pick it up any day this week. Please respond with any questions. Thanks so much.      Beto He

## 2022-12-06 DIAGNOSIS — G43.809 VESTIBULAR MIGRAINE: ICD-10-CM

## 2022-12-08 RX ORDER — NORTRIPTYLINE HYDROCHLORIDE 50 MG/1
CAPSULE ORAL
Qty: 90 CAPSULE | Refills: 0 | Status: SHIPPED | OUTPATIENT
Start: 2022-12-08

## 2023-03-22 DIAGNOSIS — G43.809 VESTIBULAR MIGRAINE: ICD-10-CM

## 2023-03-23 RX ORDER — NORTRIPTYLINE HYDROCHLORIDE 50 MG/1
CAPSULE ORAL
Qty: 90 CAPSULE | Refills: 0 | OUTPATIENT
Start: 2023-03-23

## 2023-03-24 ENCOUNTER — TELEMEDICINE (OUTPATIENT)
Dept: PRIMARY CARE CLINIC | Age: 53
End: 2023-03-24
Payer: COMMERCIAL

## 2023-03-24 DIAGNOSIS — G43.809 VESTIBULAR MIGRAINE: Primary | ICD-10-CM

## 2023-03-24 PROCEDURE — 99213 OFFICE O/P EST LOW 20 MIN: CPT | Performed by: NURSE PRACTITIONER

## 2023-03-24 RX ORDER — NORTRIPTYLINE HYDROCHLORIDE 10 MG/1
CAPSULE ORAL
Qty: 98 CAPSULE | Refills: 0 | Status: SHIPPED | OUTPATIENT
Start: 2023-03-24 | End: 2023-04-21

## 2023-03-24 ASSESSMENT — ENCOUNTER SYMPTOMS
NAUSEA: 0
VOMITING: 0
SHORTNESS OF BREATH: 0
COUGH: 0
DIARRHEA: 0

## 2023-03-24 NOTE — PROGRESS NOTES
3/24/2023    TELEHEALTH EVALUATION -- Audio/Visual (During BFIBR-62 public health emergency)    HPI:    Yuridia Borges (:  1970) has requested an audio/video evaluation for the following concern(s):    Vanessa Hummel presents to day to discuss her medication 50mg nortriptyline dx with vestibular migraines by ENT, she is reports they are  greatly improved. She associates several triggers to causing her migraines:   Triggers- diet issues and lack of sleep, caffeine. She additionally attributes hormone imbalance being related to migraines. She states her stress management has also gotten better now that she is not planning her daughters wedding. She states that she has learned to decrease triggers and has been balancing her hormones better. Because of this she would like to taper her nortriptyline to see if her lifestyle management is sufficient. Review of Systems   Constitutional:  Negative for activity change, appetite change, chills and fever. Respiratory:  Negative for cough and shortness of breath. Gastrointestinal:  Negative for diarrhea, nausea and vomiting. Skin:  Negative for rash. Neurological:  Positive for headaches (greatly improving). Psychiatric/Behavioral:  The patient is nervous/anxious. Prior to Visit Medications    Medication Sig Taking? Authorizing Provider   nortriptyline (PAMELOR) 10 MG capsule Take 4 capsules by mouth nightly for 14 days, THEN 3 capsules nightly for 14 days.  Yes ALEXIS Kaur CNP   albuterol sulfate HFA (PROVENTIL HFA) 108 (90 Base) MCG/ACT inhaler Inhale 2 puffs into the lungs every 6 hours as needed for Wheezing  ALEXIS Kaur CNP       Social History     Tobacco Use    Smoking status: Never    Smokeless tobacco: Never   Vaping Use    Vaping Use: Never used   Substance Use Topics    Alcohol use: Yes     Comment: occasional     Drug use: Never        PHYSICAL EXAMINATION:  [ INSTRUCTIONS:  \"[x]\" Indicates a positive item  \"[]\" Indicates a

## 2023-04-21 DIAGNOSIS — G43.809 VESTIBULAR MIGRAINE: ICD-10-CM

## 2023-04-24 DIAGNOSIS — G43.809 VESTIBULAR MIGRAINE: ICD-10-CM

## 2023-04-24 RX ORDER — NORTRIPTYLINE HYDROCHLORIDE 10 MG/1
CAPSULE ORAL
Qty: 98 CAPSULE | Refills: 0 | OUTPATIENT
Start: 2023-04-24 | End: 2023-05-22

## 2023-04-24 RX ORDER — NORTRIPTYLINE HYDROCHLORIDE 10 MG/1
CAPSULE ORAL
Qty: 98 CAPSULE | Refills: 0 | OUTPATIENT
Start: 2023-04-24

## 2023-04-24 NOTE — TELEPHONE ENCOUNTER
Please ask pt to f/u in office she was advised to f/u prior to running out of medication as we are trying to taper her off

## 2023-05-26 DIAGNOSIS — G43.809 VESTIBULAR MIGRAINE: ICD-10-CM

## 2023-05-26 RX ORDER — NORTRIPTYLINE HYDROCHLORIDE 10 MG/1
CAPSULE ORAL
Qty: 42 CAPSULE | Refills: 0 | OUTPATIENT
Start: 2023-05-26

## 2024-06-28 ENCOUNTER — OFFICE VISIT (OUTPATIENT)
Dept: PRIMARY CARE CLINIC | Age: 54
End: 2024-06-28
Payer: COMMERCIAL

## 2024-06-28 VITALS
WEIGHT: 154 LBS | SYSTOLIC BLOOD PRESSURE: 105 MMHG | TEMPERATURE: 97.3 F | BODY MASS INDEX: 27.28 KG/M2 | DIASTOLIC BLOOD PRESSURE: 78 MMHG | OXYGEN SATURATION: 98 % | HEART RATE: 84 BPM

## 2024-06-28 DIAGNOSIS — R07.9 CHEST PAIN, UNSPECIFIED TYPE: ICD-10-CM

## 2024-06-28 DIAGNOSIS — R42 VERTIGO: ICD-10-CM

## 2024-06-28 DIAGNOSIS — E78.5 HYPERLIPIDEMIA, UNSPECIFIED HYPERLIPIDEMIA TYPE: ICD-10-CM

## 2024-06-28 DIAGNOSIS — I49.9 IRREGULAR HEART BEAT: ICD-10-CM

## 2024-06-28 DIAGNOSIS — R20.2 PARESTHESIA OF BOTH HANDS: ICD-10-CM

## 2024-06-28 DIAGNOSIS — M79.89 SWELLING OF LEFT FOOT: ICD-10-CM

## 2024-06-28 DIAGNOSIS — R07.9 CHEST PAIN, UNSPECIFIED TYPE: Primary | ICD-10-CM

## 2024-06-28 DIAGNOSIS — J45.909 ASTHMA DUE TO SEASONAL ALLERGIES: ICD-10-CM

## 2024-06-28 DIAGNOSIS — M72.2 PLANTAR FASCIITIS: ICD-10-CM

## 2024-06-28 DIAGNOSIS — R11.0 NAUSEA: ICD-10-CM

## 2024-06-28 DIAGNOSIS — M79.672 LEFT FOOT PAIN: ICD-10-CM

## 2024-06-28 DIAGNOSIS — R06.02 SOB (SHORTNESS OF BREATH): ICD-10-CM

## 2024-06-28 DIAGNOSIS — F41.9 ANXIETY: ICD-10-CM

## 2024-06-28 LAB
ALBUMIN SERPL-MCNC: 4.9 G/DL (ref 3.4–5)
ALBUMIN/GLOB SERPL: 1.8 {RATIO} (ref 1.1–2.2)
ALP SERPL-CCNC: 82 U/L (ref 40–129)
ALT SERPL-CCNC: 93 U/L (ref 10–40)
ANION GAP SERPL CALCULATED.3IONS-SCNC: 14 MMOL/L (ref 3–16)
AST SERPL-CCNC: 45 U/L (ref 15–37)
BASOPHILS # BLD: 0 K/UL (ref 0–0.2)
BASOPHILS NFR BLD: 0.9 %
BILIRUB SERPL-MCNC: 0.6 MG/DL (ref 0–1)
BUN SERPL-MCNC: 20 MG/DL (ref 7–20)
CALCIUM SERPL-MCNC: 10.1 MG/DL (ref 8.3–10.6)
CHLORIDE SERPL-SCNC: 104 MMOL/L (ref 99–110)
CHOLEST SERPL-MCNC: 216 MG/DL (ref 0–199)
CO2 SERPL-SCNC: 24 MMOL/L (ref 21–32)
CREAT SERPL-MCNC: 0.7 MG/DL (ref 0.6–1.1)
DEPRECATED RDW RBC AUTO: 13.7 % (ref 12.4–15.4)
EOSINOPHIL # BLD: 0.4 K/UL (ref 0–0.6)
EOSINOPHIL NFR BLD: 6.4 %
GFR SERPLBLD CREATININE-BSD FMLA CKD-EPI: >90 ML/MIN/{1.73_M2}
GLUCOSE SERPL-MCNC: 102 MG/DL (ref 70–99)
HCT VFR BLD AUTO: 44.7 % (ref 36–48)
HDLC SERPL-MCNC: 64 MG/DL (ref 40–60)
HGB BLD-MCNC: 14.7 G/DL (ref 12–16)
LDL CHOLESTEROL: 117 MG/DL
LYMPHOCYTES # BLD: 2.1 K/UL (ref 1–5.1)
LYMPHOCYTES NFR BLD: 38.6 %
MCH RBC QN AUTO: 30.8 PG (ref 26–34)
MCHC RBC AUTO-ENTMCNC: 32.9 G/DL (ref 31–36)
MCV RBC AUTO: 93.7 FL (ref 80–100)
MONOCYTES # BLD: 0.4 K/UL (ref 0–1.3)
MONOCYTES NFR BLD: 7.9 %
NEUTROPHILS # BLD: 2.6 K/UL (ref 1.7–7.7)
NEUTROPHILS NFR BLD: 46.2 %
PLATELET # BLD AUTO: 269 K/UL (ref 135–450)
PMV BLD AUTO: 8.3 FL (ref 5–10.5)
POTASSIUM SERPL-SCNC: 4.4 MMOL/L (ref 3.5–5.1)
PROT SERPL-MCNC: 7.6 G/DL (ref 6.4–8.2)
RBC # BLD AUTO: 4.77 M/UL (ref 4–5.2)
SODIUM SERPL-SCNC: 142 MMOL/L (ref 136–145)
TRIGL SERPL-MCNC: 174 MG/DL (ref 0–150)
TSH SERPL DL<=0.005 MIU/L-ACNC: 1.72 UIU/ML (ref 0.27–4.2)
VLDLC SERPL CALC-MCNC: 35 MG/DL
WBC # BLD AUTO: 5.6 K/UL (ref 4–11)

## 2024-06-28 PROCEDURE — 99213 OFFICE O/P EST LOW 20 MIN: CPT | Performed by: NURSE PRACTITIONER

## 2024-06-28 RX ORDER — PROMETHAZINE HYDROCHLORIDE 25 MG/1
25 TABLET ORAL EVERY 6 HOURS PRN
Qty: 20 TABLET | Refills: 0 | Status: SHIPPED | OUTPATIENT
Start: 2024-06-28 | End: 2024-07-08

## 2024-06-28 RX ORDER — ALBUTEROL SULFATE 90 UG/1
2 AEROSOL, METERED RESPIRATORY (INHALATION) EVERY 6 HOURS PRN
Qty: 18 G | Refills: 3 | Status: SHIPPED | OUTPATIENT
Start: 2024-06-28

## 2024-06-28 NOTE — PROGRESS NOTES
2024    Nataliia Yang (:  1970) is a 53 y.o. female, here for evaluation of the following medical concerns:    Chief Complaint   Patient presents with    Dizziness    Shortness of Breath       Nataliia is here with her , she is complaining of recent intermittent symptoms of vertigo, shortness of breath, hot/sweaty with chest pressure, ongoing bilateral arms/hands numbness and tingling, new onset of bilateral leg/feet tingling,  fatigue, anxiety, not sleeping well, foggy brain, abdominal swelling, bilateral lower leg and edema, mild nausea.  Patient with history of vestibular migraines, asthma due to seasonal allergies, cervical DDD.  Patient reports symptoms do not feel related to her vestibular migraines as she does not have vision change or light sensitivity that she has had in the past.  Patient reports new onset of symptoms started last week she does report she was out side in very hot weather completing strenuous tasks as she was preparing for family friend wedding.  Was able to discount symptoms to do strenuous work, heat/hydration and reports with rest felt a little better, following day she left for trip to [place] reports symptoms returned with new onset of bilateral leg/feet tingling, feelings of midsection/abdominal swelling, denies pain change in bowels, bilateral lower leg edema.symptoms were intermittent however seem to progress patient left for home early from her trip.  Patient reports she has an Apple Watch was monitoring some of her symptoms and reports pulse ox 93, heart rate was high, EKG  telling her to stop moving she feels this was due to irregular heart rate. BP at home -114/88, 116/89 101/80 IR /68 IR HR on .    Patient has tried hydration, rest, water pill, Xanax, baby aspirin, with no real avail.  She reports intermittent use of her Phenergan has been helpful with her symptoms.  She is also decreased/stopped chocolate intake, caffeine, red wine which has also

## 2024-06-29 LAB
EST. AVERAGE GLUCOSE BLD GHB EST-MCNC: 122.6 MG/DL
HBA1C MFR BLD: 5.9 %

## 2024-06-30 PROBLEM — R73.03 PRE-DIABETES: Status: ACTIVE | Noted: 2024-06-30

## 2024-06-30 NOTE — RESULT ENCOUNTER NOTE
Please reach out to pt for a good time to call to discuss lab findings    Lab Review  A1C- 5.9 which is considered Pre- diabetes we have options such as diet/exercise, stating low dose Metformin, would like to discuss possibly a 14 day monitor to help with a baseline of the effects of your diet/exercise, as your current symptoms are possibly related to the fluctuating of your blood sugar    TSH- looks good no signs of thyroid issues    CBC- looks good no signs of Anemias    CMP- noting elevated fasting glucose, no electrolyte imbalances, kidneys look great, but did not elevated liver enzymes, most likely d/t the red wine, would     Lipid/Cholesterol panel- noting elevated levels, could possibly start low dose medication as well as working on diet/exercise as noted below. We can discuss further on our follow up    Cholesterol, the ideal numbers explained:  Total cholesterol should be below 200 yours is 216  The triglycerides should be below 150 yours is 174  The HDL, the good cholesterol should be above 40 yours is 64  The LDL, the bad cholesterol should be below 100. Yours is 117 Although it can be as high as 130 if no risk factors for heart disease or no diabetes.    Improve your cholesterol profile:     Cardiovascular exercise helps. Start with 15 minutes three times a week and the work up to at least 30 minutes 6 days a week. Exercise at a level that you can carry on a conversation during.     Loose extra pounds.     Pay attention to your serving sides and avoid eating second helpings at meals.    Significantly decrease the amount of processed food, junk food, and fast food that you eat.    Decrease animal sources of saturated fats, and completely avoid and eliminate hydrogenated oils and trans fats. Check the Food Label on the food you eat and avoid foods that have hydrogenated vegetable oils in them. That includes bakery products. Meat and cheese generally contains saturated fat.     Drink skim milk which

## 2024-07-01 DIAGNOSIS — I49.9 IRREGULAR HEART BEAT: Primary | ICD-10-CM

## 2024-07-01 DIAGNOSIS — R42 VERTIGO: ICD-10-CM

## 2024-07-01 DIAGNOSIS — R07.9 CHEST PAIN, UNSPECIFIED TYPE: ICD-10-CM

## 2024-07-01 DIAGNOSIS — R06.02 SOB (SHORTNESS OF BREATH): ICD-10-CM

## 2024-07-01 DIAGNOSIS — R55 PRE-SYNCOPE: ICD-10-CM

## 2024-07-01 NOTE — RESULT ENCOUNTER NOTE
-Discussed results with patient ultimately decided on doing a 3-day Holter, based on ongoing tachycardia, lightheadedness/presyncope episodes, shortness of breath, chest pain, irregular heart rate/palpitations orders have been placed  -Discussed prediabetes, metformin, exercise diet, discussed CGM, patient will stop by for 14-day Dexcom sample, concerns for hypoglycemic events  -Patient was agreeable to plan of care plans to research metformin consider starting medication,

## 2024-11-21 ENCOUNTER — PATIENT MESSAGE (OUTPATIENT)
Dept: PRIMARY CARE CLINIC | Age: 54
End: 2024-11-21

## 2024-11-21 DIAGNOSIS — R00.2 PALPITATION: ICD-10-CM

## 2024-11-21 DIAGNOSIS — R73.03 PRE-DIABETES: ICD-10-CM

## 2024-11-21 DIAGNOSIS — E78.5 HYPERLIPIDEMIA, UNSPECIFIED HYPERLIPIDEMIA TYPE: Primary | ICD-10-CM

## 2024-11-21 DIAGNOSIS — R03.0 ELEVATED BP WITHOUT DIAGNOSIS OF HYPERTENSION: ICD-10-CM

## 2024-11-22 NOTE — TELEPHONE ENCOUNTER
Hey there. I have a medical question for you. Roc and I want to get this test at [facility] done. Ct coronary calcium score screening. It is 99$ out of pocket. But we need an order to schedule it. Would you be able to fax an order? [phone]. Or me fax it from you? We had the [facility] screening done and Roc (Roc has PVCs) has some blockage in his left carotid and he is between cardiologists right now so we wanted to get this done too and then find him a new one. And I want to have it done because of my raised BP and missed heart beats that are happening before I go too. Thanks, Joanne. Just let us know if you would send an order to them for Roc and myself.     1. Hyperlipidemia, unspecified hyperlipidemia type    - CT CARDIAC CALCIUM SCORING; Future    2. Pre-diabetes    - CT CARDIAC CALCIUM SCORING; Future    3. Elevated BP without diagnosis of hypertension    - CT CARDIAC CALCIUM SCORING; Future    4. Palpitation    - CT CARDIAC CALCIUM SCORING; Future     Will fax as requested